# Patient Record
Sex: FEMALE | Race: WHITE | NOT HISPANIC OR LATINO | URBAN - METROPOLITAN AREA
[De-identification: names, ages, dates, MRNs, and addresses within clinical notes are randomized per-mention and may not be internally consistent; named-entity substitution may affect disease eponyms.]

---

## 2023-08-24 PROBLEM — L67.8 DRY HAIR: Status: ACTIVE | Noted: 2023-08-24

## 2023-08-24 PROBLEM — L85.3 DRY SKIN: Status: ACTIVE | Noted: 2023-08-24

## 2023-08-24 PROBLEM — I10 BENIGN ESSENTIAL HTN: Status: ACTIVE | Noted: 2023-08-24

## 2023-08-24 PROBLEM — E03.8 SUBCLINICAL HYPOTHYROIDISM: Status: ACTIVE | Noted: 2023-08-24

## 2023-08-24 PROBLEM — D58.2 ELEVATED HEMOGLOBIN (CMS-HCC): Status: ACTIVE | Noted: 2023-08-24

## 2023-08-24 PROBLEM — F31.9 BIPOLAR 1 DISORDER (MULTI): Status: ACTIVE | Noted: 2023-08-24

## 2023-08-24 PROBLEM — I42.9 CARDIOMYOPATHY (MULTI): Status: ACTIVE | Noted: 2023-08-24

## 2023-08-24 PROBLEM — M62.838 MUSCLE SPASM: Status: ACTIVE | Noted: 2023-08-24

## 2023-08-24 PROBLEM — R41.3 MEMORY LOSS: Status: ACTIVE | Noted: 2023-08-24

## 2023-08-24 PROBLEM — F07.0 FRONTAL LOBE SYNDROME: Status: ACTIVE | Noted: 2023-08-24

## 2023-08-24 PROBLEM — M19.90 OSTEOARTHRITIS: Status: ACTIVE | Noted: 2023-08-24

## 2023-08-24 PROBLEM — E78.5 HYPERLIPIDEMIA: Status: ACTIVE | Noted: 2023-08-24

## 2023-08-24 PROBLEM — H90.3 SENSORINEURAL HEARING LOSS, BILATERAL: Status: ACTIVE | Noted: 2023-08-24

## 2023-08-24 PROBLEM — I48.19 PERSISTENT ATRIAL FIBRILLATION (MULTI): Status: ACTIVE | Noted: 2023-08-24

## 2023-08-24 PROBLEM — M79.7 FIBROMYALGIA: Status: ACTIVE | Noted: 2023-08-24

## 2023-08-24 PROBLEM — H93.11 TINNITUS, RIGHT: Status: ACTIVE | Noted: 2023-08-24

## 2023-08-24 PROBLEM — M81.0 OSTEOPOROSIS: Status: ACTIVE | Noted: 2023-08-24

## 2023-08-24 PROBLEM — K59.09 CHRONIC CONSTIPATION: Status: ACTIVE | Noted: 2023-08-24

## 2023-08-24 PROBLEM — H91.90 HEARING LOSS: Status: ACTIVE | Noted: 2023-08-24

## 2023-08-24 PROBLEM — F03.90 MAJOR NEUROCOGNITIVE DISORDER (MULTI): Status: ACTIVE | Noted: 2023-08-24

## 2023-08-24 PROBLEM — Z86.79 HISTORY OF CARDIOMYOPATHY: Status: ACTIVE | Noted: 2023-08-24

## 2023-08-24 RX ORDER — METOPROLOL SUCCINATE 50 MG/1
1 TABLET, EXTENDED RELEASE ORAL DAILY
COMMUNITY
Start: 2018-12-17 | End: 2024-05-22

## 2023-08-24 RX ORDER — CHOLECALCIFEROL (VITAMIN D3) 25 MCG
1 TABLET ORAL DAILY
COMMUNITY
Start: 2019-11-06

## 2023-08-24 RX ORDER — METOPROLOL TARTRATE 50 MG/1
1 TABLET ORAL 2 TIMES DAILY
COMMUNITY

## 2023-08-24 RX ORDER — ZINC SULFATE 50(220)MG
CAPSULE ORAL
COMMUNITY

## 2023-08-24 RX ORDER — IBUPROFEN 100 MG/5ML
1 SUSPENSION, ORAL (FINAL DOSE FORM) ORAL DAILY
COMMUNITY
Start: 2019-11-06

## 2023-08-24 RX ORDER — UBIDECARENONE/VIT E ACET 100MG-5
CAPSULE ORAL
COMMUNITY
Start: 2022-06-23

## 2023-08-24 RX ORDER — APIXABAN 5 MG/1
1 TABLET, FILM COATED ORAL 2 TIMES DAILY
COMMUNITY
Start: 2016-08-17 | End: 2024-03-25

## 2023-08-24 RX ORDER — RISPERIDONE 2 MG/1
1 TABLET ORAL NIGHTLY
COMMUNITY
Start: 2016-03-09 | End: 2023-12-05

## 2023-08-24 RX ORDER — LEVOTHYROXINE SODIUM 25 UG/1
1 TABLET ORAL DAILY
COMMUNITY
Start: 2016-02-26 | End: 2024-03-13 | Stop reason: SDUPTHER

## 2023-08-24 RX ORDER — AMLODIPINE BESYLATE 5 MG/1
1 TABLET ORAL DAILY
COMMUNITY
End: 2024-04-02 | Stop reason: WASHOUT

## 2023-10-01 PROBLEM — R07.89 CHEST PAIN, MIDSTERNAL: Status: ACTIVE | Noted: 2023-10-01
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
## 2023-10-03 ENCOUNTER — OFFICE VISIT (OUTPATIENT)
Dept: CARDIOLOGY | Facility: CLINIC | Age: 73
End: 2023-10-03
Payer: MEDICARE

## 2023-10-03 VITALS
DIASTOLIC BLOOD PRESSURE: 82 MMHG | HEART RATE: 104 BPM | BODY MASS INDEX: 23.74 KG/M2 | WEIGHT: 134 LBS | SYSTOLIC BLOOD PRESSURE: 134 MMHG | OXYGEN SATURATION: 93 %

## 2023-10-03 DIAGNOSIS — I48.19 PERSISTENT ATRIAL FIBRILLATION (MULTI): Primary | ICD-10-CM

## 2023-10-03 DIAGNOSIS — I10 PRIMARY HYPERTENSION: ICD-10-CM

## 2023-10-03 DIAGNOSIS — I42.9 CARDIOMYOPATHY, UNSPECIFIED TYPE (MULTI): ICD-10-CM

## 2023-10-03 PROBLEM — I48.91 ATRIAL FIBRILLATION (MULTI): Status: ACTIVE | Noted: 2023-10-03

## 2023-10-03 PROCEDURE — 3075F SYST BP GE 130 - 139MM HG: CPT | Performed by: INTERNAL MEDICINE

## 2023-10-03 PROCEDURE — 99214 OFFICE O/P EST MOD 30 MIN: CPT | Performed by: INTERNAL MEDICINE

## 2023-10-03 PROCEDURE — 93000 ELECTROCARDIOGRAM COMPLETE: CPT | Performed by: INTERNAL MEDICINE

## 2023-10-03 PROCEDURE — 1159F MED LIST DOCD IN RCRD: CPT | Performed by: INTERNAL MEDICINE

## 2023-10-03 PROCEDURE — 3079F DIAST BP 80-89 MM HG: CPT | Performed by: INTERNAL MEDICINE

## 2023-10-03 PROCEDURE — 1160F RVW MEDS BY RX/DR IN RCRD: CPT | Performed by: INTERNAL MEDICINE

## 2023-10-03 NOTE — ASSESSMENT & PLAN NOTE
· Paroxysmal-had 1/30/2016 ELIZABETH DCCV at [place] but went back into Afib 2      days later.      Was in NSR on 5/24/21 OV.       In AFIB 6/23/22-increased metoprolol but still in afib on 8/29/22 OV      9/15/22 DCCV to NSR      In AFIB on 9/30/22 OV-asymptomatic/rate controlled.      Saw EP-continue rate control/AC(On Eliquis)

## 2023-10-03 NOTE — PROGRESS NOTES
Chief Complaint:   Coronary Artery Disease (6 MONTH FUV )     History Of Present Illness:    Poppy Pop is a 72 y.o. female.  Patient denies chest pain/SOB/palpitations/dizziness/lightheadedness/edema/claudication.  Active-walks    No bleeding with Eliquis     Last Recorded Vitals:  Vitals:    10/03/23 1329   BP: 134/82   BP Location: Left arm   Patient Position: Sitting   Pulse: 104   SpO2: 93%   Weight: 60.8 kg (134 lb)            Allergies:  Atorvastatin, Penicillins, Nsaids (non-steroidal anti-inflammatory drug), and Valdecoxib    Outpatient Medications:  Current Outpatient Medications   Medication Instructions    amLODIPine (Norvasc) 5 mg tablet 1 tablet, oral, Daily    ascorbic acid (Vitamin C) 1,000 mg tablet 1 tablet, oral, Daily    cholecalciferol (Vitamin D-3) 25 MCG (1000 UT) tablet 1 tablet, oral, Daily    collagen/biotin/ascorbic acid (COLLAGEN 1500 PLUS C ORAL) 1 capsule, oral, Daily    Eliquis 5 mg tablet 1 tablet, oral, 2 times daily    levothyroxine (Synthroid, Levoxyl) 25 mcg tablet 1 tablet, oral, Daily    metoprolol succinate XL (Toprol-XL) 50 mg 24 hr tablet 1 tablet, oral, Daily    metoprolol tartrate (Lopressor) 50 mg tablet 1 tablet, oral, 2 times daily    NON FORMULARY oral, Chlorella Oral [id number] 4 times a day    NON FORMULARY 1 Dose, Both Eyes, 4 times daily PRN    QUERCETIN ORAL 50 mg    resveratroL 100 mg capsule Resveratrol 100 MG Oral Capsule  Refills: 0  Start: 23-Jun-2022    risperiDONE (RisperDAL) 2 mg tablet 1 tablet, oral, Nightly    VITAMIN B COMPLEX ORAL primal force (b vitamins) liquid form<BR>    zinc sulfate (Zincate) 220 (50 Zn) MG capsule Zinc CAPS  Refills: 0       Physical Exam:  PHYSICAL EXAM:    Constitutional/General:  Alert and oriented x3, well appearing, nontoxic, and in NAD  Neck:  No increased JVP,no carotid bruits.  Respiratory:  Lungs clear to auscultation bilaterally, no wheezes, rales, or rhonchi, not in respiratory distress  Cardiovascular:  HIIR with  "rate OK, no murmurs, gallops, or rubs, PMI nondisplaced, 2+ distal pulses  Chest:  No chest wall tenderness  GI:  Abdomen soft, nontender, nondistended, + BS, no organomegaly, no palpable masses, no rebound, guarding, or rigidity  Musculoskeletal:  Moves all extremities x4  Extremities: No peripheral edema  Integument: Skin warm and dry, no rashes  Neurologic:  No focal deficits  Psychiatric:  Normal affect    Vital Signs, Nursing Notes, Allergies, and Medications reviewed by me.     Last Labs:  CBC -  Lab Results   Component Value Date    WBC 5.9 05/04/2022    HGB 17.1 (H) 05/04/2022    HCT 53.9 (H) 05/04/2022    MCV 97 05/04/2022     05/04/2022       CMP -  Lab Results   Component Value Date    CALCIUM 10.3 05/04/2022    PROT 8.0 05/04/2022    ALBUMIN 4.5 05/04/2022    AST 23 05/04/2022    ALT 24 05/04/2022    ALKPHOS 97 05/04/2022    BILITOT 1.0 05/04/2022       LIPID PANEL -   Lab Results   Component Value Date    CHOL 187 05/04/2022    TRIG 114 05/04/2022    HDL 74.6 05/04/2022    CHHDL 2.5 05/04/2022    LDLF 90 05/04/2022    VLDL 23 05/04/2022       RENAL FUNCTION PANEL -   Lab Results   Component Value Date    GLUCOSE 94 05/04/2022     05/04/2022    K 3.7 05/04/2022     05/04/2022    CO2 31 05/04/2022    ANIONGAP 13 05/04/2022    BUN 30 (H) 05/04/2022    CREATININE 0.84 05/04/2022    CALCIUM 10.3 05/04/2022    ALBUMIN 4.5 05/04/2022        No results found for: \"BNP\", \"HGBA1C\"        Lab review: I have personally reviewed the laboratory result(s)    Assessment/Plan       · Atrial fibrillation (427.31) (I48.91)   · Paroxysmal-had 1/30/2016 ELIZABETH DCCV at [place] but went back into Afib 2      days later.      Was in NSR on 5/24/21 OV.       In AFIB 6/23/22-increased metoprolol but still in afib on 8/29/22 OV      9/15/22 DCCV to NSR      In AFIB on 9/30/22 OV-asymptomatic/rate controlled.      Saw EP-continue rate control/AC(On Eliquis)   · Benign essential HTN (401.1) (I10)   · BP " stable   · Bipolar affective disorder (296.80) (F31.9)   · Follows with psychiatry   · Cardiac risk counseling (V65.49) (Z71.89)   · 2018 coronary calcuim score =0      5/2022 LDL=90      No definite statin indication      Recheck lipids   · History of cardiomyopathy (V12.59) (Z86.79)   · EF=45-50% per 1/2016 echo in setting afib.3/2018 echo with NL LVEF.  EF 55-60% per 4/2023 echo    Check lipids/BMP    Pancho Pérez, DO

## 2023-10-10 ENCOUNTER — LAB (OUTPATIENT)
Dept: LAB | Facility: LAB | Age: 73
End: 2023-10-10
Payer: MEDICARE

## 2023-10-10 DIAGNOSIS — I48.19 PERSISTENT ATRIAL FIBRILLATION (MULTI): ICD-10-CM

## 2023-10-10 DIAGNOSIS — I10 PRIMARY HYPERTENSION: ICD-10-CM

## 2023-10-10 LAB
ANION GAP SERPL CALC-SCNC: 14 MMOL/L (ref 10–20)
BUN SERPL-MCNC: 24 MG/DL (ref 6–23)
CALCIUM SERPL-MCNC: 10 MG/DL (ref 8.6–10.6)
CHLORIDE SERPL-SCNC: 103 MMOL/L (ref 98–107)
CHOLEST SERPL-MCNC: 207 MG/DL (ref 0–199)
CHOLESTEROL/HDL RATIO: 2.6
CO2 SERPL-SCNC: 30 MMOL/L (ref 21–32)
CREAT SERPL-MCNC: 0.97 MG/DL (ref 0.5–1.05)
GFR SERPL CREATININE-BSD FRML MDRD: 61 ML/MIN/1.73M*2
GLUCOSE SERPL-MCNC: 88 MG/DL (ref 74–99)
HDLC SERPL-MCNC: 78.3 MG/DL
LDLC SERPL CALC-MCNC: 107 MG/DL (ref 140–190)
NON HDL CHOLESTEROL: 129 MG/DL (ref 0–149)
POTASSIUM SERPL-SCNC: 4.2 MMOL/L (ref 3.5–5.3)
SODIUM SERPL-SCNC: 143 MMOL/L (ref 136–145)
TRIGL SERPL-MCNC: 110 MG/DL (ref 0–149)
VLDL: 22 MG/DL (ref 0–40)

## 2023-10-10 PROCEDURE — 80048 BASIC METABOLIC PNL TOTAL CA: CPT

## 2023-10-10 PROCEDURE — 80061 LIPID PANEL: CPT

## 2023-10-10 PROCEDURE — 36415 COLL VENOUS BLD VENIPUNCTURE: CPT

## 2023-10-26 ENCOUNTER — OFFICE VISIT (OUTPATIENT)
Dept: BEHAVIORAL HEALTH | Facility: CLINIC | Age: 73
End: 2023-10-26
Payer: MEDICARE

## 2023-10-26 VITALS — TEMPERATURE: 97.8 F | BODY MASS INDEX: 24.1 KG/M2 | HEIGHT: 63 IN | WEIGHT: 136 LBS | HEART RATE: 86 BPM

## 2023-10-26 DIAGNOSIS — F31.9 BIPOLAR 1 DISORDER (MULTI): ICD-10-CM

## 2023-10-26 PROCEDURE — 1159F MED LIST DOCD IN RCRD: CPT | Performed by: PSYCHIATRY & NEUROLOGY

## 2023-10-26 PROCEDURE — 99213 OFFICE O/P EST LOW 20 MIN: CPT | Performed by: PSYCHIATRY & NEUROLOGY

## 2023-10-26 PROCEDURE — 1126F AMNT PAIN NOTED NONE PRSNT: CPT | Performed by: PSYCHIATRY & NEUROLOGY

## 2023-10-26 PROCEDURE — 1160F RVW MEDS BY RX/DR IN RCRD: CPT | Performed by: PSYCHIATRY & NEUROLOGY

## 2023-10-26 ASSESSMENT — PAIN SCALES - GENERAL: PAINLEVEL: 0-NO PAIN
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
## 2023-10-26 NOTE — PATIENT INSTRUCTIONS
Plan:   - Continue risperdal 2mg at bedtime.   - Healthy lifestyle factors discussed.   - Follow up 6 months.   - Call sooner ([phone]) in case of any questions or concerns.     Brain healthy lifestyle:   - Make sure your medical conditions (if any) such as diabetes, high blood pressure, high cholesterol, thyroid disease sleep apnea are optimally controlled.   - Use eyeglasses or hearing aids appropriately if needed.   - Eat a heart healthy diet (like a Mediterranean diet; lots of fruits and vegetables, fish; low fat;)  - Exercise regularly as tolerated.   - Maintain good sleep hygiene; avoid daytime naps; try to get 7 to 8 hours of continuous sleep at night.   - Stay mentally active - puzzles, word searches, books, playing cards.  - Stay socially active and engaged.

## 2023-12-05 DIAGNOSIS — F31.9 BIPOLAR 1 DISORDER (MULTI): ICD-10-CM

## 2023-12-05 RX ORDER — RISPERIDONE 2 MG/1
2 TABLET ORAL NIGHTLY
Qty: 90 TABLET | Refills: 1 | Status: SHIPPED | OUTPATIENT
Start: 2023-12-05 | End: 2024-04-19

## 2023-12-15 ENCOUNTER — NURSE ONLY (OUTPATIENT)
Dept: BEHAVIORAL HEALTH | Facility: CLINIC | Age: 73
End: 2023-12-15
Payer: MEDICARE

## 2023-12-15 NOTE — PROGRESS NOTES
Patient called with concerns for a refill on her risperidone 2mg, RN follow up with the pharmacy and they report that patient will be able to  medication on 12/23.  RN has called and informed patient that she will be able to  medication on 12/23

## 2023-12-21 ENCOUNTER — TELEPHONE (OUTPATIENT)
Dept: OTHER | Age: 73
End: 2023-12-21
Payer: MEDICARE

## 2023-12-21 NOTE — TELEPHONE ENCOUNTER
Please contact the patient at [phone].    Patient has questions/concerns regarding a recent application she submitted for a discount on her Risperidone prescription. Nadyana is requiring a letter from the treating physician.

## 2024-03-11 DIAGNOSIS — E03.8 OTHER SPECIFIED HYPOTHYROIDISM: ICD-10-CM

## 2024-03-11 DIAGNOSIS — E03.8 SUBCLINICAL HYPOTHYROIDISM: ICD-10-CM

## 2024-03-13 RX ORDER — LEVOTHYROXINE SODIUM 25 UG/1
25 TABLET ORAL DAILY
Qty: 30 TABLET | Refills: 0 | Status: SHIPPED | OUTPATIENT
Start: 2024-03-13

## 2024-03-24 DIAGNOSIS — I48.91 UNSPECIFIED ATRIAL FIBRILLATION (MULTI): ICD-10-CM

## 2024-03-25 RX ORDER — APIXABAN 5 MG/1
5 TABLET, FILM COATED ORAL 2 TIMES DAILY
Qty: 60 TABLET | Refills: 7 | Status: SHIPPED | OUTPATIENT
Start: 2024-03-25 | End: 2024-04-02 | Stop reason: SDUPTHER

## 2024-03-26 PROBLEM — Z71.89 CARDIAC RISK COUNSELING: Status: ACTIVE | Noted: 2024-03-26

## 2024-03-26 PROBLEM — Z86.79 HISTORY OF CARDIOMYOPATHY: Status: RESOLVED | Noted: 2023-08-24 | Resolved: 2024-03-26

## 2024-04-02 ENCOUNTER — OFFICE VISIT (OUTPATIENT)
Dept: CARDIOLOGY | Facility: CLINIC | Age: 74
End: 2024-04-02
Payer: MEDICARE

## 2024-04-02 VITALS
HEART RATE: 107 BPM | SYSTOLIC BLOOD PRESSURE: 130 MMHG | WEIGHT: 142 LBS | BODY MASS INDEX: 25.15 KG/M2 | OXYGEN SATURATION: 95 % | DIASTOLIC BLOOD PRESSURE: 90 MMHG

## 2024-04-02 DIAGNOSIS — I10 BENIGN ESSENTIAL HTN: Primary | ICD-10-CM

## 2024-04-02 DIAGNOSIS — Z71.89 CARDIAC RISK COUNSELING: ICD-10-CM

## 2024-04-02 DIAGNOSIS — I10 PRIMARY HYPERTENSION: ICD-10-CM

## 2024-04-02 DIAGNOSIS — I42.9 CARDIOMYOPATHY, UNSPECIFIED TYPE (MULTI): ICD-10-CM

## 2024-04-02 DIAGNOSIS — I48.21 PERMANENT ATRIAL FIBRILLATION (MULTI): ICD-10-CM

## 2024-04-02 DIAGNOSIS — I48.91 UNSPECIFIED ATRIAL FIBRILLATION (MULTI): ICD-10-CM

## 2024-04-02 PROCEDURE — 1036F TOBACCO NON-USER: CPT | Performed by: INTERNAL MEDICINE

## 2024-04-02 PROCEDURE — 99213 OFFICE O/P EST LOW 20 MIN: CPT | Performed by: INTERNAL MEDICINE

## 2024-04-02 PROCEDURE — 3080F DIAST BP >= 90 MM HG: CPT | Performed by: INTERNAL MEDICINE

## 2024-04-02 PROCEDURE — 3075F SYST BP GE 130 - 139MM HG: CPT | Performed by: INTERNAL MEDICINE

## 2024-04-02 PROCEDURE — 1159F MED LIST DOCD IN RCRD: CPT | Performed by: INTERNAL MEDICINE

## 2024-04-02 PROCEDURE — 1160F RVW MEDS BY RX/DR IN RCRD: CPT | Performed by: INTERNAL MEDICINE

## 2024-04-02 NOTE — PROGRESS NOTES
Chief Complaint:   Follow-up (Patient is here for a followup)     History Of Present Illness:    Poppy Pop is a 73 y.o. female.  Patient denies chest pain/SOB/palpitations/dizziness/lightheadedness/edema/claudication.  Active-walks  Has constipation    No bleeding with Eliquis     Last Recorded Vitals:  Vitals:    04/02/24 1313 04/02/24 1336   BP: (!) 136/96 130/90   BP Location: Right arm    Patient Position: Sitting    BP Cuff Size: Adult    Pulse: 107    SpO2: 95%    Weight: 64.4 kg (142 lb)             Allergies:  Atorvastatin, Penicillins, Betamethasone dipropionate, Nsaids (non-steroidal anti-inflammatory drug), and Valdecoxib    Outpatient Medications:  Current Outpatient Medications   Medication Instructions    ascorbic acid (Vitamin C) 1,000 mg tablet 1 tablet, oral, Daily    cholecalciferol (Vitamin D-3) 25 MCG (1000 UT) tablet 1 tablet, oral, Daily    collagen/biotin/ascorbic acid (COLLAGEN 1500 PLUS C ORAL) 1 capsule, oral, Daily    Eliquis 5 mg, oral, 2 times daily    levothyroxine (SYNTHROID, LEVOXYL) 25 mcg, oral, Daily    metoprolol succinate XL (Toprol-XL) 50 mg 24 hr tablet 1 tablet, oral, Daily    metoprolol tartrate (Lopressor) 50 mg tablet 1 tablet, oral, 2 times daily    NON FORMULARY oral, Chlorella Oral [id number] 4 times a day    NON FORMULARY 1 Dose, Both Eyes, 4 times daily PRN    QUERCETIN ORAL 50 mg    resveratroL 100 mg capsule Resveratrol 100 MG Oral Capsule  Refills: 0  Start: 23-Jun-2022    risperiDONE (RISPERDAL) 2 mg, oral, Nightly    VITAMIN B COMPLEX ORAL primal force (b vitamins) liquid form<BR>    zinc sulfate (Zincate) 220 (50 Zn) MG capsule Zinc CAPS  Refills: 0       Physical Exam:  PHYSICAL EXAM:    Constitutional/General:  Alert and oriented x3, well appearing, nontoxic, and in NAD  Neck:  No increased JVP,no carotid bruits.  Respiratory:  Lungs clear to auscultation bilaterally, no wheezes, rales, or rhonchi, not in respiratory distress  Cardiovascular:  HIIR with  "rate OK, no murmurs, gallops, or rubs, PMI nondisplaced, 2+ distal pulses  Chest:  No chest wall tenderness  GI:  Abdomen soft, nontender, nondistended, + BS, no organomegaly, no palpable masses, no rebound, guarding, or rigidity  Musculoskeletal:  Moves all extremities x4  Extremities: No peripheral edema  Integument: Skin warm and dry, no rashes  Neurologic:  No focal deficits  Psychiatric:  Normal affect    Vital Signs, Nursing Notes, Allergies, and Medications reviewed by me.     Last Labs:  CBC -  Lab Results   Component Value Date    WBC 5.9 05/04/2022    HGB 17.1 (H) 05/04/2022    HCT 53.9 (H) 05/04/2022    MCV 97 05/04/2022     05/04/2022       CMP -  Lab Results   Component Value Date    CALCIUM 10.0 10/10/2023    PROT 8.0 05/04/2022    ALBUMIN 4.5 05/04/2022    AST 23 05/04/2022    ALT 24 05/04/2022    ALKPHOS 97 05/04/2022    BILITOT 1.0 05/04/2022       LIPID PANEL -   Lab Results   Component Value Date    CHOL 207 (H) 10/10/2023    TRIG 110 10/10/2023    HDL 78.3 10/10/2023    CHHDL 2.6 10/10/2023    LDLF 90 05/04/2022    VLDL 22 10/10/2023    NHDL 129 10/10/2023   10/2023 LDL equals 107    RENAL FUNCTION PANEL -   Lab Results   Component Value Date    GLUCOSE 88 10/10/2023     10/10/2023    K 4.2 10/10/2023     10/10/2023    CO2 30 10/10/2023    ANIONGAP 14 10/10/2023    BUN 24 (H) 10/10/2023    CREATININE 0.97 10/10/2023    CALCIUM 10.0 10/10/2023    ALBUMIN 4.5 05/04/2022        No results found for: \"BNP\", \"HGBA1C\"        Lab review: I have personally reviewed the laboratory result(s)    Assessment/Plan       · Atrial fibrillation (427.31) (I48.91)   · Paroxysmal-had 1/30/2016 ELIZABETH DCCV at [place] but went back into Afib 2      days later.      Was in NSR on 5/24/21 OV.       In AFIB 6/23/22-increased metoprolol but still in afib on 8/29/22 OV      9/15/22 DCCV to NSR      In AFIB on 9/30/22 OV-asymptomatic/rate controlled.      Saw EP-continue rate control/AC(On " Eliquis)   · Benign essential HTN (401.1) (I10)   · BP stable   · Bipolar affective disorder (296.80) (F31.9)   · Follows with psychiatry   · Cardiac risk counseling (V65.49) (Z71.89)   · 2018 coronary calcuim score =0      5/2022 LDL=90      No definite statin indication      Recheck lipids   · History of cardiomyopathy (V12.59) (Z86.79)   · EF=45-50% per 1/2016 echo in setting afib.3/2018 echo with NL LVEF.  EF 55-60% per 4/2023 echo    Watch salt intake as DBP high    Pancho Pérez, DO

## 2024-04-19 DIAGNOSIS — F31.9 BIPOLAR 1 DISORDER (MULTI): ICD-10-CM

## 2024-04-19 RX ORDER — RISPERIDONE 2 MG/1
2 TABLET ORAL NIGHTLY
Qty: 90 TABLET | Refills: 1 | Status: SHIPPED | OUTPATIENT
Start: 2024-04-19

## 2024-04-25 ENCOUNTER — OFFICE VISIT (OUTPATIENT)
Dept: BEHAVIORAL HEALTH | Facility: CLINIC | Age: 74
End: 2024-04-25
Payer: MEDICARE

## 2024-04-25 VITALS — HEIGHT: 63 IN | WEIGHT: 146.2 LBS | BODY MASS INDEX: 25.91 KG/M2 | TEMPERATURE: 96.5 F | HEART RATE: 80 BPM

## 2024-04-25 DIAGNOSIS — F31.9 BIPOLAR 1 DISORDER (MULTI): ICD-10-CM

## 2024-04-25 PROCEDURE — 1160F RVW MEDS BY RX/DR IN RCRD: CPT | Performed by: PSYCHIATRY & NEUROLOGY

## 2024-04-25 PROCEDURE — 1159F MED LIST DOCD IN RCRD: CPT | Performed by: PSYCHIATRY & NEUROLOGY

## 2024-04-25 PROCEDURE — 99213 OFFICE O/P EST LOW 20 MIN: CPT | Performed by: PSYCHIATRY & NEUROLOGY

## 2024-04-25 ASSESSMENT — ENCOUNTER SYMPTOMS
DEPRESSION: 0
OCCASIONAL FEELINGS OF UNSTEADINESS: 0
LOSS OF SENSATION IN FEET: 0
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
## 2024-04-25 NOTE — PATIENT INSTRUCTIONS
Plan:   - Continue risperdal 2mg at bedtime.   - Please talk to your primary care doctor about your elevated blood pressure.   - Brain-healthy lifestyle:        Eat heart-healthy diet       Regular physical activity        Regular mental exercise such as reading books, doing puzzles, playing cards/board games etc.        Stay socially engaged       Maintain good sleep hygiene and try to get 7 to 8 hours of continuous sleep at bedtime.   - Follow up in 6 months.   - Contact via [facility] or call sooner ([phone]) in case of any questions or concerns.  - Call [phone] for mental health crisis or suicidal thoughts, or call 911 or go to the nearest emergency room for emergencies.

## 2024-05-21 DIAGNOSIS — I10 ESSENTIAL (PRIMARY) HYPERTENSION: ICD-10-CM

## 2024-05-22 RX ORDER — METOPROLOL SUCCINATE 50 MG/1
50 TABLET, EXTENDED RELEASE ORAL DAILY
Qty: 90 TABLET | Refills: 2 | Status: SHIPPED | OUTPATIENT
Start: 2024-07-12

## 2024-06-21 DIAGNOSIS — E03.8 SUBCLINICAL HYPOTHYROIDISM: ICD-10-CM

## 2024-06-27 ENCOUNTER — TELEPHONE (OUTPATIENT)
Dept: CARDIOLOGY | Facility: CLINIC | Age: 74
End: 2024-06-27
Payer: MEDICARE

## 2024-06-27 NOTE — TELEPHONE ENCOUNTER
Pharmacy is asking for refill on levothyroxine 25mcg     Looks like it is for subclinical hypothyroidism     She has not had any lab work for this since 2022   Should blood work be done prior to sending in for refill?

## 2024-07-16 RX ORDER — LEVOTHYROXINE SODIUM 25 UG/1
25 TABLET ORAL DAILY
Qty: 30 TABLET | Refills: 0 | Status: SHIPPED | OUTPATIENT
Start: 2024-07-16

## 2024-08-01 ENCOUNTER — LAB (OUTPATIENT)
Dept: LAB | Facility: LAB | Age: 74
End: 2024-08-01
Payer: MEDICARE

## 2024-08-01 DIAGNOSIS — E03.8 SUBCLINICAL HYPOTHYROIDISM: ICD-10-CM

## 2024-08-01 LAB — TSH SERPL-ACNC: 4.27 MIU/L (ref 0.44–3.98)

## 2024-08-01 PROCEDURE — 36415 COLL VENOUS BLD VENIPUNCTURE: CPT

## 2024-08-01 PROCEDURE — 84443 ASSAY THYROID STIM HORMONE: CPT

## 2024-08-06 ENCOUNTER — TELEPHONE (OUTPATIENT)
Dept: CARDIOLOGY | Facility: CLINIC | Age: 74
End: 2024-08-06
Payer: MEDICARE

## 2024-08-06 DIAGNOSIS — E03.8 SUBCLINICAL HYPOTHYROIDISM: ICD-10-CM

## 2024-08-06 NOTE — TELEPHONE ENCOUNTER
Pt calling about her Levothyroxine is almost out Dr Pérez ordered TSH levels she needs a refill not sure if she should stay on the same dose pt said if she doesn't get call within the hour she will call back.

## 2024-08-07 DIAGNOSIS — E03.8 SUBCLINICAL HYPOTHYROIDISM: ICD-10-CM

## 2024-08-07 RX ORDER — LEVOTHYROXINE SODIUM 25 UG/1
37.5 TABLET ORAL DAILY
COMMUNITY
Start: 2024-08-07 | End: 2024-08-07 | Stop reason: SDUPTHER

## 2024-08-07 RX ORDER — LEVOTHYROXINE SODIUM 25 UG/1
37.5 TABLET ORAL DAILY
Qty: 160 TABLET | Refills: 0 | Status: SHIPPED | OUTPATIENT
Start: 2024-08-07

## 2024-09-10 ENCOUNTER — TELEPHONE (OUTPATIENT)
Dept: OBSTETRICS AND GYNECOLOGY | Facility: CLINIC | Age: 74
End: 2024-09-10
Payer: MEDICARE

## 2024-09-26 ENCOUNTER — CLINICAL SUPPORT (OUTPATIENT)
Dept: AUDIOLOGY | Facility: CLINIC | Age: 74
End: 2024-09-26
Payer: MEDICARE

## 2024-09-26 DIAGNOSIS — H90.3 SENSORINEURAL HEARING LOSS (SNHL) OF BOTH EARS: Primary | ICD-10-CM

## 2024-09-26 PROCEDURE — 92567 TYMPANOMETRY: CPT | Performed by: AUDIOLOGIST

## 2024-09-26 PROCEDURE — 92557 COMPREHENSIVE HEARING TEST: CPT | Performed by: AUDIOLOGIST

## 2024-09-26 NOTE — PROGRESS NOTES
Chief Complaint   Patient presents with    Hearing Loss     HISTORY:  Poppy Pop, age 73 years, was seen for audiogram in conjunction with otolaryngology appointment on 9/26/2024.  Ms. Pop reports bilateral hearing loss.  She states she has worn two sets of hearing aids in the past.  The most recent set was purchased through [facility] over six years ago.  The hearing aids have stopped working.  She states she is not interested in purchasing new hearing aids.  She plans to purchase two over the counter hearing aid amplifiers instead due to cost.  Ms. Pop feels her left ear is her better ear.  Please see medical records for complete history.    RESULTS:  Prior to testing both external auditory canals were clear and tympanic membranes visualized    Immittance and acoustic reflexes:  Immittance testing yielded TYPE A tympanograms indicating normal middle ear function both ears  Acoustic reflexes were not tested due to lack of seal.    Audiogram:  Mild to moderate severe sensorineural hearing loss 250  - 8000 Hz both ears  Speech reception thresholds obtained at 40 dBHL both ears  Speech discrimination scores were 100% at 80 dBHL    IMPRESSIONS:  Normal middle ear function noted both ears  Mild to moderate severe sensorineural hearing loss  Ms. Pop was given multiple copies of her audiogram today to assist in her pursuit of over the counter hearing aids    RECOMMENDATIONS:  1.  Follow up with otolaryngology  2.  Retest hearing levels annually    time: 1435 - 1457

## 2024-09-27 ENCOUNTER — APPOINTMENT (OUTPATIENT)
Dept: OTOLARYNGOLOGY | Facility: CLINIC | Age: 74
End: 2024-09-27
Payer: MEDICARE

## 2024-09-27 VITALS — WEIGHT: 134 LBS | BODY MASS INDEX: 23.74 KG/M2 | HEIGHT: 63 IN

## 2024-09-27 DIAGNOSIS — H90.3 SENSORINEURAL HEARING LOSS (SNHL) OF BOTH EARS: Primary | ICD-10-CM

## 2024-09-27 PROCEDURE — 99203 OFFICE O/P NEW LOW 30 MIN: CPT | Performed by: NURSE PRACTITIONER

## 2024-09-27 PROCEDURE — 1159F MED LIST DOCD IN RCRD: CPT | Performed by: NURSE PRACTITIONER

## 2024-09-27 PROCEDURE — 1160F RVW MEDS BY RX/DR IN RCRD: CPT | Performed by: NURSE PRACTITIONER

## 2024-09-27 PROCEDURE — 1036F TOBACCO NON-USER: CPT | Performed by: NURSE PRACTITIONER

## 2024-09-27 ASSESSMENT — PATIENT HEALTH QUESTIONNAIRE - PHQ9
SUM OF ALL RESPONSES TO PHQ9 QUESTIONS 1 AND 2: 0
1. LITTLE INTEREST OR PLEASURE IN DOING THINGS: NOT AT ALL
2. FEELING DOWN, DEPRESSED OR HOPELESS: NOT AT ALL

## 2024-09-27 NOTE — PROGRESS NOTES
Subjective   Patient ID: Poppy Pop is a 73 y.o. female who presents for New Patient Visit (Bilateral ear pain pt had a fall.).  HPI  This patient presents for hearing aid clearance and progressive bilateral sensorineural hearing loss.  She has worn hearing aids in the past with good benefit but they are no longer working.  She reports that they are 6 to 8 years old.  Today, she denies any otalgia, otorrhea.  She denies any history of otologic surgery.  Review of Systems  A comprehensive or 10 points review of the patient's constitutional, neurological, HEENT, pulmonary, cardiovascular and genito-urinary systems showed only those mentioned in the HPI.  Objective   Physical Exam  Constitutional: no fever, chills, weight loss or weight gain   General appearance: Appears well, well-nourished, well groomed. No acute distress.   Communication: Normal communication   Psychiatric: Oriented to person, place and time. Normal mood and affect.  Flight of thoughts and rapid speech.    Neurologic: Cranial nerves II-XII grossly intact and symmetric bilaterally.   Head and Face:   Head: Atraumatic with no masses, lesions or scarring.   Face: Normal symmetry, no paralysis, synkinesis or facial tic. No scars or deformities.     Eyes: Conjunctiva not edematous or erythematous   Ears: External inspection of ears with no deformity, scars or masses. Bilateral EACs clear and bilateral TMs intact with no signs of effusions   Nose: External inspection of nose: No nasal lesions, lacerations or scars.   Neck: Normal appearing, symmetric, trachea midline.   Cardiovascular: Examination of peripheral vascular system shows no clubbing or cyanosis.   Respiratory: No respiratory distress increased work of breathing. Inspection of the chest with symmetric chest expansion and normal respiratory effort.   Skin: No rashes in the head or neck  My interpretation of the audiogram done yesterday is mild to moderate sensorineural hearing loss  bilaterally.  Excellent word recognition scores and normal tympanograms bilaterally.  Assessment/Plan     This patient presents for initial evaluation of chronic acquired bilateral sensorineural hearing loss.    Reassurance given that otologic exam today is normal.  Audiogram was reviewed in detail.  She is an excellent candidate for new hearing aids but states that she cannot afford the out-of-pocket cost and is looking into over-the-counter hearing aids.  She may follow-up as needed.  All questions were answered to patient's satisfaction.    This note was created using speech recognition transcription software. Despite proofreading, several typographical errors might be present that might affect the meaning of the content. Please call with any questions.         TU Lindquist-CNP 09/27/24 9:24 AM

## 2024-10-02 ENCOUNTER — APPOINTMENT (OUTPATIENT)
Dept: CARDIOLOGY | Facility: CLINIC | Age: 74
End: 2024-10-02
Payer: MEDICARE

## 2024-10-16 NOTE — PROGRESS NOTES
"Subjective   Patient ID: Poppy Pop is a 73 y.o. female who presents for Medicare Annual Wellness Visit Subsequent.      HPI   The patient is taking levothyroxine for hypothyroidism and metoprolol for hypertension. She is taking these medications as prescribed and denies having side effects from them. She complains of chronic pain after having a fall.     Review of Systems  Constitutional: No fever or chills, No Night Sweats  Eyes: No Blurry Vision or Eye sight problems  ENT: No Nasal Discharge, Hoarseness, sore throat  Cardiovascular: no chest pain, no palpitations and no syncope.   Respiratory: no cough, no shortness of breath during exertion and no shortness of breath at rest.   Gastrointestinal: no abdominal pain, no nausea and no vomiting.   : No vaginal discharge, burning with urination, no blood in urine or stools  Skin: No Skin rashes or Lesions  Neuro: No Headache, no dizziness or Numbness or tingling  Psych: No Anxiety, depression or sleeping problems  Heme: No Easy bleeding or brusing.     Objective   /75   Pulse 64   Ht 1.607 m (5' 3.25\")   Wt 71.2 kg (157 lb)   SpO2 95%   BMI 27.59 kg/m²     Physical Exam  Constitutional: Alert and in no acute distress. Well developed, well nourished.   Head and Face: Head and face: Normal.    Eyes: Normal external exam.   Cardiovascular: Heart rate and rhythm were normal, normal S1 and S2. Pedal pulses: Normal. No peripheral edema.   Pulmonary: No respiratory distress. Clear bilateral breath sounds.   Musculoskeletal: No joint swelling seen, normal movements of all extremities. Range of motion: Normal.  Muscle strength/tone: Normal.    Skin: Normal skin color and pigmentation, normal skin turgor, and no rash.   Psychiatric: Patient has very vague thinking and very talkative, difficult to control her talking. Judgment and insight: poor    Lab Results   Component Value Date    WBC 5.9 05/04/2022    HGB 17.1 (H) 05/04/2022    HCT 53.9 (H) 05/04/2022    PLT " 222 05/04/2022    CHOL 207 (H) 10/10/2023    TRIG 110 10/10/2023    HDL 78.3 10/10/2023    ALT 24 05/04/2022    AST 23 05/04/2022     10/10/2023    K 4.2 10/10/2023     10/10/2023    CREATININE 0.97 10/10/2023    BUN 24 (H) 10/10/2023    CO2 30 10/10/2023    TSH 4.27 (H) 08/01/2024       ECG 12 Lead  afib  ECG 12 Lead  afib      Assessment/Plan   Diagnoses and all orders for this visit:  Medicare annual wellness visit, subsequent  -     1 Year Follow Up In [facility] - PCP - Wellness Exam; Future  Major neurocognitive disorder (Multi)  Elevated hemoglobin (CMS-HCC)  Persistent atrial fibrillation (Multi)  Benign essential HTN  -     CBC; Future  -     Comprehensive Metabolic Panel; Future  -     Lipid Panel; Future  Subclinical hypothyroidism  -     TSH with reflex to Free T4 if abnormal; Future  Bipolar 1 disorder (Multi)  Vitamin D deficiency  -     Vitamin B12; Future  -     Vitamin D 25-Hydroxy,Total (for eval of Vitamin D levels); Future  Elevated blood sugar  -     Hemoglobin A1C; Future  Asymptomatic menopausal state  -     XR DEXA bone density; Future  Encounter for screening mammogram for breast cancer  -     BI mammo bilateral screening tomosynthesis; Future  Fall, initial encounter  -     XR knee 4+ views bilateral; Future  -     XR ankle bilateral 2 views; Future  -     XR elbow left 3+ views; Future        Dear Poppy Pop     It was my pleasure to take care of you today in the office. Below are the things we discussed today:    1. 1. Immunizations: Yearly Flu shot is recommended. Declined by the patient          a: COVID:  Declined by the patient          b: Tetanus: Up-to-date         c: Shingrix:  Declined by the patient          d: RSV:  Declined by the patient          e: Prevnar:  She will let me know if she would like to get it     2. Blood Work: Ordered   3. Seen your dentist twice a year  4. Yearly Eye exam is recommended    5. BMI: Overweight   6: Diet recommendations:    Eat Clean, Try to have as many home cooked meals as possible  Avoid processed foods which contain excess calories, sugar, and sodium.    7. Exercise recommendations:   150 minutes a week to maintain your weight     If you have to loose weight, you need a better diet and exercise plan.     8. Supplements recommended:  a - Calcium 600 mg up to twice a day to get a total of 1200 mg. Each 8 oz of milk or yogurt or 1 oz of cheese, 1 Banana, 1 serving of green Leafy vegetable has about 300 mg of Calcium, so you may subtract that amount. Calcium citrate is the only acceptable supplement to take if you take an acid suppressing medication like Prilosec; otherwise Calcium carbonate is acceptable too (It can cause Constipation).   b - Vitamin D - 2000 IU daily     9. Please get your Living will / Advance directive completed if you do not have one already. Please make sure our office has a copy of the latest one.     10. Colonoscopy: Uptodate, repeat in Jan 2027   11. Mammogram: Ordered   12. PAP: Not indicated  13. DEXA: Ordered   14: Skin Check: Please see Dermatology once a year for a Skin Check.     15. Hypothyroidism: Continue levothyroxine.    16. Hypertension: Continue metoprolol.     17. Fall: Ordered ankle, knee and left elbow x-rays.    18. A fib -Continue Eliquis and Metoprolol and follow up with Cardiology    Follow up in one year for a Physical. Please call the office before your Physical to see if you need blood work completed prior to your physical.     Please call me if any questions arise from now until your next visit. I will call you after I am done seeing patients. A Doctor is always available by phone when the office is closed. Please feel free to call for help with any problem that you feel shouldn't wait until the office re-opens.     Scribe Attestation  By signing my name below, IKristen Scribe   attest that this documentation has been prepared under the direction and in the presence of  Anna Finney MD.

## 2024-10-17 ENCOUNTER — APPOINTMENT (OUTPATIENT)
Dept: OBSTETRICS AND GYNECOLOGY | Facility: CLINIC | Age: 74
End: 2024-10-17
Payer: COMMERCIAL

## 2024-10-17 ENCOUNTER — APPOINTMENT (OUTPATIENT)
Dept: PRIMARY CARE | Facility: CLINIC | Age: 74
End: 2024-10-17
Payer: MEDICARE

## 2024-10-17 ENCOUNTER — HOSPITAL ENCOUNTER (OUTPATIENT)
Dept: RADIOLOGY | Facility: CLINIC | Age: 74
Discharge: HOME | End: 2024-10-17
Payer: MEDICARE

## 2024-10-17 ENCOUNTER — LAB (OUTPATIENT)
Dept: LAB | Facility: LAB | Age: 74
End: 2024-10-17
Payer: COMMERCIAL

## 2024-10-17 VITALS
BODY MASS INDEX: 27.82 KG/M2 | HEIGHT: 63 IN | WEIGHT: 157 LBS | OXYGEN SATURATION: 95 % | HEART RATE: 64 BPM | SYSTOLIC BLOOD PRESSURE: 135 MMHG | DIASTOLIC BLOOD PRESSURE: 75 MMHG

## 2024-10-17 DIAGNOSIS — F03.90 MAJOR NEUROCOGNITIVE DISORDER (MULTI): ICD-10-CM

## 2024-10-17 DIAGNOSIS — E03.8 SUBCLINICAL HYPOTHYROIDISM: ICD-10-CM

## 2024-10-17 DIAGNOSIS — I48.19 PERSISTENT ATRIAL FIBRILLATION (MULTI): ICD-10-CM

## 2024-10-17 DIAGNOSIS — R73.9 ELEVATED BLOOD SUGAR: ICD-10-CM

## 2024-10-17 DIAGNOSIS — F31.9 BIPOLAR 1 DISORDER (MULTI): ICD-10-CM

## 2024-10-17 DIAGNOSIS — Z00.00 MEDICARE ANNUAL WELLNESS VISIT, SUBSEQUENT: Primary | ICD-10-CM

## 2024-10-17 DIAGNOSIS — E55.9 VITAMIN D DEFICIENCY: ICD-10-CM
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
## 2024-10-17 DIAGNOSIS — D58.2 ELEVATED HEMOGLOBIN (CMS-HCC): ICD-10-CM

## 2024-10-17 DIAGNOSIS — I10 BENIGN ESSENTIAL HTN: ICD-10-CM

## 2024-10-17 DIAGNOSIS — Z78.0 ASYMPTOMATIC MENOPAUSAL STATE: ICD-10-CM

## 2024-10-17 DIAGNOSIS — Z12.31 ENCOUNTER FOR SCREENING MAMMOGRAM FOR BREAST CANCER: ICD-10-CM

## 2024-10-17 PROBLEM — Z71.89 CARDIAC RISK COUNSELING: Status: RESOLVED | Noted: 2024-03-26 | Resolved: 2024-10-17

## 2024-10-17 PROBLEM — R07.89 CHEST PAIN, MIDSTERNAL: Status: RESOLVED | Noted: 2023-10-01 | Resolved: 2024-10-17

## 2024-10-17 PROBLEM — L67.8 DRY HAIR: Status: RESOLVED | Noted: 2023-08-24 | Resolved: 2024-10-17

## 2024-10-17 PROBLEM — M62.838 MUSCLE SPASM: Status: RESOLVED | Noted: 2023-08-24 | Resolved: 2024-10-17

## 2024-10-17 PROBLEM — I48.91 ATRIAL FIBRILLATION (MULTI): Status: RESOLVED | Noted: 2023-10-03 | Resolved: 2024-10-17

## 2024-10-17 PROBLEM — F07.0 FRONTAL LOBE SYNDROME: Status: RESOLVED | Noted: 2023-08-24 | Resolved: 2024-10-17

## 2024-10-17 PROBLEM — L85.3 DRY SKIN: Status: RESOLVED | Noted: 2023-08-24 | Resolved: 2024-10-17

## 2024-10-17 PROBLEM — H91.90 HEARING LOSS: Status: RESOLVED | Noted: 2023-08-24 | Resolved: 2024-10-17

## 2024-10-17 PROCEDURE — 73564 X-RAY EXAM KNEE 4 OR MORE: CPT | Mod: 50

## 2024-10-17 PROCEDURE — 84443 ASSAY THYROID STIM HORMONE: CPT

## 2024-10-17 PROCEDURE — 80053 COMPREHEN METABOLIC PANEL: CPT

## 2024-10-17 PROCEDURE — 80061 LIPID PANEL: CPT

## 2024-10-17 PROCEDURE — 82306 VITAMIN D 25 HYDROXY: CPT

## 2024-10-17 PROCEDURE — 73600 X-RAY EXAM OF ANKLE: CPT | Mod: 50

## 2024-10-17 PROCEDURE — 83036 HEMOGLOBIN GLYCOSYLATED A1C: CPT

## 2024-10-17 PROCEDURE — 73080 X-RAY EXAM OF ELBOW: CPT | Mod: LT

## 2024-10-17 PROCEDURE — 82607 VITAMIN B-12: CPT

## 2024-10-17 PROCEDURE — 36415 COLL VENOUS BLD VENIPUNCTURE: CPT

## 2024-10-17 PROCEDURE — 85027 COMPLETE CBC AUTOMATED: CPT

## 2024-10-17 ASSESSMENT — ENCOUNTER SYMPTOMS
LOSS OF SENSATION IN FEET: 0
OCCASIONAL FEELINGS OF UNSTEADINESS: 0
DEPRESSION: 0

## 2024-10-17 ASSESSMENT — COLUMBIA-SUICIDE SEVERITY RATING SCALE - C-SSRS
2. HAVE YOU ACTUALLY HAD ANY THOUGHTS OF KILLING YOURSELF?: NO
1. IN THE PAST MONTH, HAVE YOU WISHED YOU WERE DEAD OR WISHED YOU COULD GO TO SLEEP AND NOT WAKE UP?: NO

## 2024-10-17 ASSESSMENT — ACTIVITIES OF DAILY LIVING (ADL)
BATHING: INDEPENDENT
MANAGING_FINANCES: INDEPENDENT
DRESSING: INDEPENDENT
GROCERY_SHOPPING: INDEPENDENT
TAKING_MEDICATION: INDEPENDENT
DOING_HOUSEWORK: INDEPENDENT

## 2024-10-17 ASSESSMENT — PATIENT HEALTH QUESTIONNAIRE - PHQ9
1. LITTLE INTEREST OR PLEASURE IN DOING THINGS: NOT AT ALL
SUM OF ALL RESPONSES TO PHQ9 QUESTIONS 1 AND 2: 0
2. FEELING DOWN, DEPRESSED OR HOPELESS: NOT AT ALL

## 2024-10-18 LAB
25(OH)D3 SERPL-MCNC: 46 NG/ML (ref 30–100)
ALBUMIN SERPL BCP-MCNC: 4.5 G/DL (ref 3.4–5)
ALP SERPL-CCNC: 88 U/L (ref 33–136)
ALT SERPL W P-5'-P-CCNC: 25 U/L (ref 7–45)
ANION GAP SERPL CALC-SCNC: 16 MMOL/L (ref 10–20)
AST SERPL W P-5'-P-CCNC: 24 U/L (ref 9–39)
BILIRUB SERPL-MCNC: 1.2 MG/DL (ref 0–1.2)
BUN SERPL-MCNC: 18 MG/DL (ref 6–23)
CALCIUM SERPL-MCNC: 10.1 MG/DL (ref 8.6–10.6)
CHLORIDE SERPL-SCNC: 100 MMOL/L (ref 98–107)
CHOLEST SERPL-MCNC: 195 MG/DL (ref 0–199)
CHOLESTEROL/HDL RATIO: 2.6
CO2 SERPL-SCNC: 28 MMOL/L (ref 21–32)
CREAT SERPL-MCNC: 0.82 MG/DL (ref 0.5–1.05)
EGFRCR SERPLBLD CKD-EPI 2021: 75 ML/MIN/1.73M*2
ERYTHROCYTE [DISTWIDTH] IN BLOOD BY AUTOMATED COUNT: 13.9 % (ref 11.5–14.5)
EST. AVERAGE GLUCOSE BLD GHB EST-MCNC: 108 MG/DL
GLUCOSE SERPL-MCNC: 96 MG/DL (ref 74–99)
HBA1C MFR BLD: 5.4 %
HCT VFR BLD AUTO: 54 % (ref 36–46)
HDLC SERPL-MCNC: 76.2 MG/DL
HGB BLD-MCNC: 17.1 G/DL (ref 12–16)
LDLC SERPL CALC-MCNC: 94 MG/DL
MCH RBC QN AUTO: 30.7 PG (ref 26–34)
MCHC RBC AUTO-ENTMCNC: 31.7 G/DL (ref 32–36)
MCV RBC AUTO: 97 FL (ref 80–100)
NON HDL CHOLESTEROL: 119 MG/DL (ref 0–149)
NRBC BLD-RTO: 0 /100 WBCS (ref 0–0)
PLATELET # BLD AUTO: 204 X10*3/UL (ref 150–450)
POTASSIUM SERPL-SCNC: 4.4 MMOL/L (ref 3.5–5.3)
PROT SERPL-MCNC: 8.2 G/DL (ref 6.4–8.2)
RBC # BLD AUTO: 5.57 X10*6/UL (ref 4–5.2)
SODIUM SERPL-SCNC: 140 MMOL/L (ref 136–145)
TRIGL SERPL-MCNC: 125 MG/DL (ref 0–149)
TSH SERPL-ACNC: 3.55 MIU/L (ref 0.44–3.98)
VIT B12 SERPL-MCNC: 1057 PG/ML (ref 211–911)
VLDL: 25 MG/DL (ref 0–40)
WBC # BLD AUTO: 5.2 X10*3/UL (ref 4.4–11.3)

## 2024-10-21 ENCOUNTER — APPOINTMENT (OUTPATIENT)
Dept: OBSTETRICS AND GYNECOLOGY | Facility: CLINIC | Age: 74
End: 2024-10-21
Payer: MEDICARE

## 2024-10-24 ENCOUNTER — OFFICE VISIT (OUTPATIENT)
Dept: ORTHOPEDIC SURGERY | Facility: CLINIC | Age: 74
End: 2024-10-24
Payer: MEDICARE

## 2024-10-24 ENCOUNTER — APPOINTMENT (OUTPATIENT)
Dept: BEHAVIORAL HEALTH | Facility: CLINIC | Age: 74
End: 2024-10-24
Payer: MEDICARE

## 2024-10-24 VITALS — BODY MASS INDEX: 26.8 KG/M2 | HEIGHT: 64 IN | WEIGHT: 157 LBS

## 2024-10-24 DIAGNOSIS — M25.562 BILATERAL CHRONIC KNEE PAIN: Primary | ICD-10-CM

## 2024-10-24 DIAGNOSIS — M25.561 BILATERAL CHRONIC KNEE PAIN: Primary | ICD-10-CM

## 2024-10-24 DIAGNOSIS — M17.11 PRIMARY OSTEOARTHRITIS OF RIGHT KNEE: ICD-10-CM

## 2024-10-24 DIAGNOSIS — M17.12 PRIMARY OSTEOARTHRITIS OF LEFT KNEE: ICD-10-CM

## 2024-10-24 DIAGNOSIS — G89.29 BILATERAL CHRONIC KNEE PAIN: Primary | ICD-10-CM

## 2024-10-24 PROCEDURE — 1123F ACP DISCUSS/DSCN MKR DOCD: CPT | Performed by: ORTHOPAEDIC SURGERY

## 2024-10-24 PROCEDURE — 1036F TOBACCO NON-USER: CPT | Performed by: ORTHOPAEDIC SURGERY

## 2024-10-24 PROCEDURE — 1125F AMNT PAIN NOTED PAIN PRSNT: CPT | Performed by: ORTHOPAEDIC SURGERY

## 2024-10-24 PROCEDURE — 1160F RVW MEDS BY RX/DR IN RCRD: CPT | Performed by: ORTHOPAEDIC SURGERY

## 2024-10-24 PROCEDURE — 99213 OFFICE O/P EST LOW 20 MIN: CPT | Performed by: ORTHOPAEDIC SURGERY

## 2024-10-24 PROCEDURE — 1159F MED LIST DOCD IN RCRD: CPT | Performed by: ORTHOPAEDIC SURGERY

## 2024-10-24 PROCEDURE — 99203 OFFICE O/P NEW LOW 30 MIN: CPT | Performed by: ORTHOPAEDIC SURGERY

## 2024-10-24 ASSESSMENT — COLUMBIA-SUICIDE SEVERITY RATING SCALE - C-SSRS
2. HAVE YOU ACTUALLY HAD ANY THOUGHTS OF KILLING YOURSELF?: NO
1. IN THE PAST MONTH, HAVE YOU WISHED YOU WERE DEAD OR WISHED YOU COULD GO TO SLEEP AND NOT WAKE UP?: NO
6. HAVE YOU EVER DONE ANYTHING, STARTED TO DO ANYTHING, OR PREPARED TO DO ANYTHING TO END YOUR LIFE?: NO

## 2024-10-24 ASSESSMENT — LIFESTYLE VARIABLES
HOW OFTEN DURING THE LAST YEAR HAVE YOU BEEN UNABLE TO REMEMBER WHAT HAPPENED THE NIGHT BEFORE BECAUSE YOU HAD BEEN DRINKING: NEVER
AUDIT TOTAL SCORE: 0
AUDIT-C TOTAL SCORE: 0
HOW OFTEN DURING THE LAST YEAR HAVE YOU NEEDED AN ALCOHOLIC DRINK FIRST THING IN THE MORNING TO GET YOURSELF GOING AFTER A NIGHT OF HEAVY DRINKING: NEVER
HOW OFTEN DURING THE LAST YEAR HAVE YOU FOUND THAT YOU WERE NOT ABLE TO STOP DRINKING ONCE YOU HAD STARTED: NEVER
HOW OFTEN DURING THE LAST YEAR HAVE YOU HAD A FEELING OF GUILT OR REMORSE AFTER DRINKING: NEVER
HAVE YOU OR SOMEONE ELSE BEEN INJURED AS A RESULT OF YOUR DRINKING: NO
HAS A RELATIVE, FRIEND, DOCTOR, OR ANOTHER HEALTH PROFESSIONAL EXPRESSED CONCERN ABOUT YOUR DRINKING OR SUGGESTED YOU CUT DOWN: NO
HOW OFTEN DURING THE LAST YEAR HAVE YOU FAILED TO DO WHAT WAS NORMALLY EXPECTED FROM YOU BECAUSE OF DRINKING: NEVER
HOW OFTEN DO YOU HAVE A DRINK CONTAINING ALCOHOL: NEVER
HOW OFTEN DO YOU HAVE SIX OR MORE DRINKS ON ONE OCCASION: NEVER
HOW MANY STANDARD DRINKS CONTAINING ALCOHOL DO YOU HAVE ON A TYPICAL DAY: PATIENT DOES NOT DRINK
SKIP TO QUESTIONS 9-10: 1

## 2024-10-24 ASSESSMENT — PATIENT HEALTH QUESTIONNAIRE - PHQ9
SUM OF ALL RESPONSES TO PHQ9 QUESTIONS 1 AND 2: 0
2. FEELING DOWN, DEPRESSED OR HOPELESS: NOT AT ALL
1. LITTLE INTEREST OR PLEASURE IN DOING THINGS: NOT AT ALL

## 2024-10-24 ASSESSMENT — PAIN - FUNCTIONAL ASSESSMENT: PAIN_FUNCTIONAL_ASSESSMENT: 0-10

## 2024-10-24 ASSESSMENT — PAIN SCALES - GENERAL
PAINLEVEL_OUTOF10: 7
PAINLEVEL_OUTOF10: 6

## 2024-10-24 ASSESSMENT — ENCOUNTER SYMPTOMS
OCCASIONAL FEELINGS OF UNSTEADINESS: 0
LOSS OF SENSATION IN FEET: 0
DEPRESSION: 0

## 2024-10-24 ASSESSMENT — PAIN DESCRIPTION - DESCRIPTORS: DESCRIPTORS: SHARP;SHOOTING
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
## 2024-10-24 NOTE — PATIENT INSTRUCTIONS
Thank you for coming to see us today!     We are going to give you a referral for physical therapy   Please call central scheduling to make this appointment.     Please follow up with us as needed

## 2024-10-28 ENCOUNTER — OFFICE VISIT (OUTPATIENT)
Dept: CARDIOLOGY | Facility: CLINIC | Age: 74
End: 2024-10-28
Payer: MEDICARE

## 2024-10-28 VITALS
WEIGHT: 158.1 LBS | BODY MASS INDEX: 28.01 KG/M2 | HEIGHT: 63 IN | DIASTOLIC BLOOD PRESSURE: 114 MMHG | SYSTOLIC BLOOD PRESSURE: 155 MMHG

## 2024-10-28 DIAGNOSIS — I77.810 AORTIC ECTASIA, THORACIC (CMS-HCC): ICD-10-CM

## 2024-10-28 DIAGNOSIS — I50.32 CHRONIC HEART FAILURE WITH PRESERVED EJECTION FRACTION: ICD-10-CM

## 2024-10-28 DIAGNOSIS — I10 ESSENTIAL HYPERTENSION: ICD-10-CM

## 2024-10-28 DIAGNOSIS — I48.19 PERSISTENT ATRIAL FIBRILLATION (MULTI): Primary | ICD-10-CM

## 2024-10-28 PROCEDURE — 99215 OFFICE O/P EST HI 40 MIN: CPT | Performed by: STUDENT IN AN ORGANIZED HEALTH CARE EDUCATION/TRAINING PROGRAM

## 2024-10-28 PROCEDURE — 1125F AMNT PAIN NOTED PAIN PRSNT: CPT | Performed by: STUDENT IN AN ORGANIZED HEALTH CARE EDUCATION/TRAINING PROGRAM

## 2024-10-28 PROCEDURE — 1159F MED LIST DOCD IN RCRD: CPT | Performed by: STUDENT IN AN ORGANIZED HEALTH CARE EDUCATION/TRAINING PROGRAM

## 2024-10-28 PROCEDURE — 93005 ELECTROCARDIOGRAM TRACING: CPT | Performed by: STUDENT IN AN ORGANIZED HEALTH CARE EDUCATION/TRAINING PROGRAM

## 2024-10-28 PROCEDURE — G2211 COMPLEX E/M VISIT ADD ON: HCPCS | Performed by: STUDENT IN AN ORGANIZED HEALTH CARE EDUCATION/TRAINING PROGRAM

## 2024-10-28 PROCEDURE — 1160F RVW MEDS BY RX/DR IN RCRD: CPT | Performed by: STUDENT IN AN ORGANIZED HEALTH CARE EDUCATION/TRAINING PROGRAM

## 2024-10-28 PROCEDURE — 3077F SYST BP >= 140 MM HG: CPT | Performed by: STUDENT IN AN ORGANIZED HEALTH CARE EDUCATION/TRAINING PROGRAM

## 2024-10-28 PROCEDURE — 3080F DIAST BP >= 90 MM HG: CPT | Performed by: STUDENT IN AN ORGANIZED HEALTH CARE EDUCATION/TRAINING PROGRAM

## 2024-10-28 PROCEDURE — 1123F ACP DISCUSS/DSCN MKR DOCD: CPT | Performed by: STUDENT IN AN ORGANIZED HEALTH CARE EDUCATION/TRAINING PROGRAM

## 2024-10-28 PROCEDURE — 99205 OFFICE O/P NEW HI 60 MIN: CPT | Performed by: STUDENT IN AN ORGANIZED HEALTH CARE EDUCATION/TRAINING PROGRAM

## 2024-10-28 RX ORDER — SPIRONOLACTONE 25 MG/1
25 TABLET ORAL DAILY
Qty: 90 TABLET | Refills: 3 | Status: SHIPPED | OUTPATIENT
Start: 2024-10-28 | End: 2025-10-28

## 2024-10-28 ASSESSMENT — ENCOUNTER SYMPTOMS
OCCASIONAL FEELINGS OF UNSTEADINESS: 0
LOSS OF SENSATION IN FEET: 0
DEPRESSION: 0

## 2024-10-28 ASSESSMENT — PAIN SCALES - GENERAL: PAINLEVEL_OUTOF10: 6

## 2024-10-29 LAB
ATRIAL RATE: 113 BPM
Q ONSET: 215 MS
QRS COUNT: 18 BEATS
QRS DURATION: 78 MS
QT INTERVAL: 354 MS
QTC CALCULATION(BAZETT): 474 MS
QTC FREDERICIA: 430 MS
R AXIS: 269 DEGREES
T AXIS: 55 DEGREES
T OFFSET: 392 MS
VENTRICULAR RATE: 108 BPM

## 2024-11-02 DIAGNOSIS — E03.8 SUBCLINICAL HYPOTHYROIDISM: ICD-10-CM

## 2024-11-04 RX ORDER — LEVOTHYROXINE SODIUM 25 UG/1
37.5 TABLET ORAL DAILY
Qty: 135 TABLET | Refills: 0 | Status: SHIPPED | OUTPATIENT
Start: 2024-11-04

## 2024-11-21 ENCOUNTER — APPOINTMENT (OUTPATIENT)
Dept: BEHAVIORAL HEALTH | Facility: CLINIC | Age: 74
End: 2024-11-21
Payer: MEDICARE

## 2024-11-21 ENCOUNTER — APPOINTMENT (OUTPATIENT)
Dept: OBSTETRICS AND GYNECOLOGY | Facility: CLINIC | Age: 74
End: 2024-11-21
Payer: MEDICARE

## 2024-11-21 VITALS — SYSTOLIC BLOOD PRESSURE: 110 MMHG | HEIGHT: 63 IN | DIASTOLIC BLOOD PRESSURE: 60 MMHG | BODY MASS INDEX: 28.01 KG/M2

## 2024-11-21 DIAGNOSIS — F31.9 BIPOLAR 1 DISORDER (MULTI): ICD-10-CM

## 2024-11-21 DIAGNOSIS — Z01.419 WELL WOMAN EXAM: Primary | ICD-10-CM

## 2024-11-21 DIAGNOSIS — Z12.4 CERVICAL CANCER SCREENING: ICD-10-CM

## 2024-11-21 PROCEDURE — 3078F DIAST BP <80 MM HG: CPT | Performed by: OBSTETRICS & GYNECOLOGY

## 2024-11-21 PROCEDURE — G0101 CA SCREEN;PELVIC/BREAST EXAM: HCPCS | Performed by: OBSTETRICS & GYNECOLOGY

## 2024-11-21 PROCEDURE — 1159F MED LIST DOCD IN RCRD: CPT | Performed by: OBSTETRICS & GYNECOLOGY

## 2024-11-21 PROCEDURE — 1036F TOBACCO NON-USER: CPT | Performed by: OBSTETRICS & GYNECOLOGY

## 2024-11-21 PROCEDURE — 88175 CYTOPATH C/V AUTO FLUID REDO: CPT

## 2024-11-21 PROCEDURE — 1160F RVW MEDS BY RX/DR IN RCRD: CPT | Performed by: OBSTETRICS & GYNECOLOGY

## 2024-11-21 PROCEDURE — 3074F SYST BP LT 130 MM HG: CPT | Performed by: OBSTETRICS & GYNECOLOGY

## 2024-11-21 PROCEDURE — 1123F ACP DISCUSS/DSCN MKR DOCD: CPT | Performed by: PSYCHIATRY & NEUROLOGY

## 2024-11-21 PROCEDURE — 1126F AMNT PAIN NOTED NONE PRSNT: CPT | Performed by: OBSTETRICS & GYNECOLOGY

## 2024-11-21 PROCEDURE — 1123F ACP DISCUSS/DSCN MKR DOCD: CPT | Performed by: OBSTETRICS & GYNECOLOGY

## 2024-11-21 PROCEDURE — 99213 OFFICE O/P EST LOW 20 MIN: CPT | Performed by: PSYCHIATRY & NEUROLOGY

## 2024-11-21 PROCEDURE — 87624 HPV HI-RISK TYP POOLED RSLT: CPT

## 2024-11-21 ASSESSMENT — ENCOUNTER SYMPTOMS
FREQUENCY: 0
FLANK PAIN: 0
FEVER: 0
HEMATURIA: 0
FATIGUE: 0
SLEEP DISTURBANCE: 0
VOMITING: 0
COLOR CHANGE: 0
CHILLS: 0
NAUSEA: 0
ABDOMINAL PAIN: 0
ABDOMINAL DISTENTION: 0
BLOOD IN STOOL: 0
DIARRHEA: 0
APPETITE CHANGE: 0
UNEXPECTED WEIGHT CHANGE: 0
DYSURIA: 0
CONSTIPATION: 0
SHORTNESS OF BREATH: 0
BACK PAIN: 0

## 2024-11-21 ASSESSMENT — PAIN SCALES - GENERAL: PAINLEVEL_OUTOF10: 0-NO PAIN

## 2024-11-21 NOTE — PATIENT INSTRUCTIONS
Treatment Plan/Recommendations:   - Continue risperdal 2mg at bedtime.   - Healthy lifestyle.   - Follow up in 6 months or sooner if needed.    Brain-healthy lifestyle:   - Make sure your medical conditions (if any) such as diabetes, high blood pressure, high cholesterol, thyroid disease sleep apnea are optimally controlled.   - Use eyeglasses or hearing aids appropriately if needed.   - Eat a heart healthy diet (like a Mediterranean diet; lots of fruits and vegetables, fish; low fat;)  - Exercise regularly as tolerated.   - Maintain good sleep hygiene; avoid daytime naps; try to get 7 to 8 hours of continuous sleep at night.   - Stay mentally active - puzzles, word searches, books, playing cards.  - Stay socially active and engaged.
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
## 2024-11-21 NOTE — PROGRESS NOTES
"History Of Present Illness  Routine Gyn Exam  Poppy SALAZAR Donn here for routine WWE.  Pt is postmenopausal.  Denies spotting or bleeding.     Concerns: no Gyn concerns.     Medical and surgical histories reviewed with patient.   Pt has history of bipolar disorder, HTN, thyroid d/o, a-fib, arthritis.  Recently had a bad fall (August).       Gynecologic History  Postmenopausal.  Sexually active:  but not currently sexually active  .  Last Pap: unsure .   Last mammogram: .   Last Colonoscopy:  up to date .   Last DEXA: .     Obstetric History  OB History    Para Term  AB Living   1         1   SAB IAB Ectopic Multiple Live Births                  # Outcome Date GA Lbr Pedro/2nd Weight Sex Type Anes PTL Lv   1                  Review of Systems   Constitutional:  Negative for appetite change, chills, fatigue, fever and unexpected weight change.   Respiratory:  Negative for shortness of breath.    Cardiovascular:  Negative for chest pain.   Gastrointestinal:  Negative for abdominal distention, abdominal pain, blood in stool, constipation, diarrhea, nausea and vomiting.   Endocrine: Negative for cold intolerance and heat intolerance.   Genitourinary:  Negative for dyspareunia, dysuria, flank pain, frequency, genital sores, hematuria, menstrual problem, pelvic pain, urgency, vaginal bleeding, vaginal discharge and vaginal pain.   Musculoskeletal:  Negative for back pain.   Skin:  Negative for color change.   Psychiatric/Behavioral:  Negative for sleep disturbance.        /60 (BP Location: Right arm, Patient Position: Sitting)   Ht 1.6 m (5' 3\")   BMI 28.01 kg/m²      Physical Exam  Constitutional:       Appearance: Normal appearance.   HENT:      Head: Normocephalic and atraumatic.   Chest:   Breasts:     Right: Normal.      Left: Normal.   Abdominal:      General: Abdomen is flat.      Palpations: Abdomen is soft.      Tenderness: There is no abdominal tenderness.   Genitourinary:    "  General: Normal vulva.      Vagina: Normal.      Cervix: Normal.      Uterus: Normal.       Adnexa: Right adnexa normal and left adnexa normal.   Skin:     General: Skin is warm and dry.   Neurological:      Mental Status: She is alert and oriented to person, place, and time.   Psychiatric:         Mood and Affect: Mood normal.              Assessment/Plan         Routine Well Woman Exam Today  Discussed diet and exercise.   Reviewed routine health screenings.   Pap: pap done today   Recommend annual mammograms. Pt has mammogram order from PCP.  Currently up to date on colon cancer screening.                  Roshni Galeas MD

## 2024-11-25 NOTE — PROGRESS NOTES
Subjective      Chief Complaint   Patient presents with    Left Hand - Pain    Right Hand - Pain        No surgery found     HPI  This 74 year old established patient presents today for evaluation of bilateral hand pain. She states that her bilateral hand pain has been present for years. She denies any recent injury or trauma to either hand. She states that her bilateral hand pain (8/10) is worse with and aggravated by activity and is typically better when resting. She has tried OTC Tylenol and NSAIDS for pain management with no relief of symptoms. She currently rates her bilateral hand pain at 8/10. She is right hand dominant. She also notes left knee pain (7/10) that is worse with and aggravated bywalking and getting from a sit to a stand.     CARDIOLOGY:   Negative for chest pain, shortness of breath.   RESPIRATORY:   Negative for chest pain, shortness of breath.   MUSCULOSKELETAL:   See HPI for details.   NEUROLOGY:   Negative for tingling, numbness, weakness.    Objective    There were no vitals filed for this visit.    Physical Exam  GENERAL:          General Appearance:  This is a pleasant patient with appropriate affect, in no acute distress.   DERMATOLOGY:          Skin: skin at the neck, upper and lower back, and trunk is intact. There is no evidence of skin rash, skin breakdown or ulceration, or atrophic skin change.   EXTREMITIES:          Vascular:  Right, left hands and feet are warm with good color and pulses. Right and left calf and thigh are nontender and nonswollen.   NEUROLOGICAL:          Orientation:  Patient is alert and oriented to person, place, time and situation. Right and left upper and lower extremity motor and sensory examinations are intact.      MUSCULOSKELETAL: Neck: Nontender. No pain with range of motion. Right and left hands: there is no tenderness at the right or left wrists. No pain with gentle ROM of the left and right wrist. position appears satisfactory Finkelstein's is  negative. Phalen's is negative.  left and right hips: Nontender.  No pain with range of motion.Left knee: nontender. No pain with gentle ROM. No effusion present. McMurrays is negative. Anterior drawer and lachmans negative. Nontender in the left calf.     X-rays of the left and right wrist done and read in the office today do not show any definite evidence of acute displaced fracture.   previous x-rays of the left and right knees done on 10- show mild osteoarthritis and also chondrocalcinosis of both the right and left knees.  I reviewed these x-rays with the patient in the office today.  ECG 12 lead (Clinic Performed)    Result Date: 11/11/2024  Atrial fibrillation with rapid ventricular response Right superior axis deviation Pulmonary disease pattern Abnormal ECG When compared with ECG of 15-SEP-2022 07:00, Criteria for Septal infarct are no longer Present Confirmed by Felix Melendez ([id number]) on 11/11/2024 6:26:22 PM       Poppy was seen today for pain and pain.  Diagnoses and all orders for this visit:  Left wrist pain (Primary)  Contusion of left wrist, initial encounter  Left wrist sprain, initial encounter  Sprain of left knee, initial encounter  Chondrocalcinosis of left knee  Chondrocalcinosis of right knee  Osteoarthritis of left knee, unspecified osteoarthritis type  Osteoarthritis of right knee, unspecified osteoarthritis type    options are discussed with the patient in detail. The patient is instructed to keep her appointment for physical therapy to evaluate and treat with gentle strengthening and ROM exercises with modalities as needed. The patient is instructed regarding activity modification and risk for further injury with falling or trauma, ice, physician directed at home gentle strengthening and ROM exercises, and the appropriate use of Tylenol as needed for pain with its potential adverse reactions and side effects. The patient understands. return to see me as needed. Please note that this  report has been produced using speech recognition software.  It may contain errors related to grammar, punctuation or spelling.  Electronically signed, but not reviewed.    Martell Newby MD

## 2024-11-27 ENCOUNTER — OFFICE VISIT (OUTPATIENT)
Dept: ORTHOPEDIC SURGERY | Facility: CLINIC | Age: 74
End: 2024-11-27
Payer: MEDICARE

## 2024-11-27 ENCOUNTER — HOSPITAL ENCOUNTER (OUTPATIENT)
Dept: RADIOLOGY | Facility: CLINIC | Age: 74
Discharge: HOME | End: 2024-11-27
Payer: MEDICARE

## 2024-11-27 VITALS — HEIGHT: 63 IN | BODY MASS INDEX: 28 KG/M2 | WEIGHT: 158 LBS

## 2024-11-27 DIAGNOSIS — M11.261 CHONDROCALCINOSIS OF RIGHT KNEE: ICD-10-CM

## 2024-11-27 DIAGNOSIS — M11.262 CHONDROCALCINOSIS OF LEFT KNEE: ICD-10-CM

## 2024-11-27 DIAGNOSIS — S83.92XA SPRAIN OF LEFT KNEE, INITIAL ENCOUNTER: ICD-10-CM

## 2024-11-27 DIAGNOSIS — M17.12 OSTEOARTHRITIS OF LEFT KNEE, UNSPECIFIED OSTEOARTHRITIS TYPE: ICD-10-CM

## 2024-11-27 DIAGNOSIS — R52 PAIN: ICD-10-CM

## 2024-11-27 DIAGNOSIS — M17.11 OSTEOARTHRITIS OF RIGHT KNEE, UNSPECIFIED OSTEOARTHRITIS TYPE: ICD-10-CM

## 2024-11-27 DIAGNOSIS — S63.502A LEFT WRIST SPRAIN, INITIAL ENCOUNTER: ICD-10-CM

## 2024-11-27 DIAGNOSIS — M25.532 LEFT WRIST PAIN: Primary | ICD-10-CM

## 2024-11-27 DIAGNOSIS — S60.212A CONTUSION OF LEFT WRIST, INITIAL ENCOUNTER: ICD-10-CM

## 2024-11-27 PROCEDURE — 99213 OFFICE O/P EST LOW 20 MIN: CPT | Performed by: ORTHOPAEDIC SURGERY

## 2024-11-27 PROCEDURE — 73130 X-RAY EXAM OF HAND: CPT | Mod: 50

## 2024-11-27 ASSESSMENT — COLUMBIA-SUICIDE SEVERITY RATING SCALE - C-SSRS
2. HAVE YOU ACTUALLY HAD ANY THOUGHTS OF KILLING YOURSELF?: NO
6. HAVE YOU EVER DONE ANYTHING, STARTED TO DO ANYTHING, OR PREPARED TO DO ANYTHING TO END YOUR LIFE?: NO
1. IN THE PAST MONTH, HAVE YOU WISHED YOU WERE DEAD OR WISHED YOU COULD GO TO SLEEP AND NOT WAKE UP?: NO

## 2024-11-27 ASSESSMENT — LIFESTYLE VARIABLES
HOW OFTEN DO YOU HAVE SIX OR MORE DRINKS ON ONE OCCASION: NEVER
HAS A RELATIVE, FRIEND, DOCTOR, OR ANOTHER HEALTH PROFESSIONAL EXPRESSED CONCERN ABOUT YOUR DRINKING OR SUGGESTED YOU CUT DOWN: NO
HOW OFTEN DURING THE LAST YEAR HAVE YOU FAILED TO DO WHAT WAS NORMALLY EXPECTED FROM YOU BECAUSE OF DRINKING: NEVER
HOW OFTEN DO YOU HAVE A DRINK CONTAINING ALCOHOL: NEVER
HOW MANY STANDARD DRINKS CONTAINING ALCOHOL DO YOU HAVE ON A TYPICAL DAY: PATIENT DOES NOT DRINK
AUDIT-C TOTAL SCORE: 0
HOW OFTEN DURING THE LAST YEAR HAVE YOU FOUND THAT YOU WERE NOT ABLE TO STOP DRINKING ONCE YOU HAD STARTED: NEVER
SKIP TO QUESTIONS 9-10: 1
AUDIT TOTAL SCORE: 0
HOW OFTEN DURING THE LAST YEAR HAVE YOU BEEN UNABLE TO REMEMBER WHAT HAPPENED THE NIGHT BEFORE BECAUSE YOU HAD BEEN DRINKING: NEVER
HAVE YOU OR SOMEONE ELSE BEEN INJURED AS A RESULT OF YOUR DRINKING: NO
HOW OFTEN DURING THE LAST YEAR HAVE YOU HAD A FEELING OF GUILT OR REMORSE AFTER DRINKING: NEVER
HOW OFTEN DURING THE LAST YEAR HAVE YOU NEEDED AN ALCOHOLIC DRINK FIRST THING IN THE MORNING TO GET YOURSELF GOING AFTER A NIGHT OF HEAVY DRINKING: NEVER

## 2024-11-27 ASSESSMENT — PAIN DESCRIPTION - DESCRIPTORS: DESCRIPTORS: ACHING;SORE

## 2024-11-27 ASSESSMENT — PAIN SCALES - GENERAL
PAINLEVEL_OUTOF10: 4
PAINLEVEL_OUTOF10: 4

## 2024-11-27 ASSESSMENT — ENCOUNTER SYMPTOMS
LOSS OF SENSATION IN FEET: 0
OCCASIONAL FEELINGS OF UNSTEADINESS: 0
DEPRESSION: 0

## 2024-11-27 ASSESSMENT — PATIENT HEALTH QUESTIONNAIRE - PHQ9
2. FEELING DOWN, DEPRESSED OR HOPELESS: NOT AT ALL
1. LITTLE INTEREST OR PLEASURE IN DOING THINGS: NOT AT ALL
SUM OF ALL RESPONSES TO PHQ9 QUESTIONS 1 AND 2: 0

## 2024-11-27 ASSESSMENT — PAIN - FUNCTIONAL ASSESSMENT: PAIN_FUNCTIONAL_ASSESSMENT: 0-10

## 2024-11-29 DIAGNOSIS — E03.8 SUBCLINICAL HYPOTHYROIDISM: ICD-10-CM

## 2024-12-01 DIAGNOSIS — F31.9 BIPOLAR 1 DISORDER (MULTI): ICD-10-CM

## 2024-12-02 RX ORDER — LEVOTHYROXINE SODIUM 25 UG/1
37.5 TABLET ORAL DAILY
COMMUNITY
Start: 2024-12-02

## 2024-12-02 RX ORDER — RISPERIDONE 2 MG/1
2 TABLET ORAL NIGHTLY
Qty: 90 TABLET | Refills: 1 | Status: SHIPPED | OUTPATIENT
Start: 2024-12-02

## 2024-12-05 LAB
CYTOLOGY CMNT CVX/VAG CYTO-IMP: NORMAL
HPV HR 12 DNA GENITAL QL NAA+PROBE: NEGATIVE
HPV HR GENOTYPES PNL CVX NAA+PROBE: NEGATIVE
HPV16 DNA SPEC QL NAA+PROBE: NEGATIVE
HPV18 DNA SPEC QL NAA+PROBE: NEGATIVE
LAB AP HPV GENOTYPE QUESTION: YES
LAB AP HPV HR: NORMAL
LABORATORY COMMENT REPORT: NORMAL
PATH REPORT.TOTAL CANCER: NORMAL

## 2024-12-10 ENCOUNTER — HOSPITAL ENCOUNTER (OUTPATIENT)
Dept: RADIOLOGY | Facility: CLINIC | Age: 74
Discharge: HOME | End: 2024-12-10
Payer: MEDICARE

## 2024-12-10 DIAGNOSIS — Z78.0 ASYMPTOMATIC MENOPAUSAL STATE: ICD-10-CM

## 2024-12-10 PROCEDURE — 77080 DXA BONE DENSITY AXIAL: CPT

## 2024-12-10 PROCEDURE — 77080 DXA BONE DENSITY AXIAL: CPT | Performed by: RADIOLOGY

## 2024-12-11 ENCOUNTER — EVALUATION (OUTPATIENT)
Dept: PHYSICAL THERAPY | Facility: CLINIC | Age: 74
End: 2024-12-11
Payer: MEDICARE

## 2024-12-11 DIAGNOSIS — M25.561 BILATERAL CHRONIC KNEE PAIN: ICD-10-CM

## 2024-12-11 DIAGNOSIS — R26.89 OTHER ABNORMALITIES OF GAIT AND MOBILITY: Primary | ICD-10-CM

## 2024-12-11 DIAGNOSIS — M17.11 PRIMARY OSTEOARTHRITIS OF RIGHT KNEE: ICD-10-CM

## 2024-12-11 DIAGNOSIS — M25.562 BILATERAL CHRONIC KNEE PAIN: ICD-10-CM

## 2024-12-11 DIAGNOSIS — G89.29 BILATERAL CHRONIC KNEE PAIN: ICD-10-CM

## 2024-12-11 DIAGNOSIS — M17.12 PRIMARY OSTEOARTHRITIS OF LEFT KNEE: ICD-10-CM

## 2024-12-11 PROCEDURE — 97162 PT EVAL MOD COMPLEX 30 MIN: CPT | Mod: GP

## 2024-12-11 ASSESSMENT — PAIN DESCRIPTION - DESCRIPTORS: DESCRIPTORS: SHARP;THROBBING

## 2024-12-11 ASSESSMENT — PAIN SCALES - GENERAL: PAINLEVEL_OUTOF10: 7

## 2024-12-11 ASSESSMENT — PAIN - FUNCTIONAL ASSESSMENT: PAIN_FUNCTIONAL_ASSESSMENT: 0-10

## 2024-12-11 NOTE — PROGRESS NOTES
Physical Therapy Evaluation     Patient Name: Poppy Pop  MRN: [id number]  Today's Date: 12/11/2024  Time Calculation  Start Time: 1130  Stop Time: 1210  Time Calculation (min): 40 min  PT Evaluation Time Entry  PT Evaluation (Moderate) Time Entry: 40          Insurance Considerations: Payor: MEDICARE / Plan: MEDICARE PART A AND B / Product Type: *No Product type* /    12/10/2024: MEDICARE A/B, [place], [place], NO AUTH, ($0 USED PT/ST)     Problem List Items Addressed This Visit             ICD-10-CM    Osteoarthritis M19.90    Relevant Orders    Follow Up In Physical Therapy    Follow Up In Physical Therapy    Bilateral chronic knee pain M25.561, M25.562, G89.29    Relevant Orders    Follow Up In Physical Therapy     Other Visit Diagnoses         Codes    Other abnormalities of gait and mobility    -  Primary R26.89    Relevant Orders    Follow Up In Physical Therapy            Subjective  /General:     Patient reported hx of current condition: The pt presents stating that on 8/31/24 she tripped on a sidewalk and fell forward landing on hands and knees, but then had to roll onto her left side. She notes that she has always had some back and knee pain, but the ankle pain began after the fall. She feels that the muscles in her legs are not normal.     She also reports bilat hand pain from the fall.    Aggravating factors: getting up from sitting (has to push with Ues), ankles hurt with walking (she got a diuretic from her cardiologist and that has been helping)  Relieving factors: standing , shifting weight back and forth, massage (goes to massage therapist)    Precautions/ Red Flags:       Red flags   Hx of CA No   Pacemaker/ Electronic Implant No   Saddle Anesthesia No   Bowel/Bladder Changes No   Sudden Weakness No   Recent Falls (within last 6mo) Yes  as noted in subjective     Relevant PMH:  HTN  Bipolar 1 disorder  Fibromyalgia  OA  Major neurocognitive disorder  Memory loss    Pain:  Pain Assessment  Pain  "Assessment: 0-10  0-10 (Numeric) Pain Score: 7  Pain Location: Ankle (L knee, & lower back)  Pain Orientation: Left, Right  Pain Radiating Towards: \"radiates down to the feet when her muscles are tight\"  Pain Descriptors: Sharp, Throbbing  Home Living:    Lives with: Spouse  Home type: House  Stairs: Yes - lives all on one floor  Occupation: retired  Prior Function Per Pt/Caregiver Report:  Prior Function Per Pt/Caregiver Report  Prior Function Comments: Uses SPC when icy or with long distances (The pt also reports utilizing a \"wiffle ball bat with a  on the end\" as well as a walking stick as other ADs)    Objective   Posture:  Posture Comment: Pt stands with forward head, rounded shoulders, and increased upper thoracic kyphosis  Range of Motion:     Strength:     Flexibility:  Flexibility Comment: tightness in piriformis and hamstrings  Palpation:     Special Tests:     Gait:  Gait Comment: Pt ambulates into the clinic indep without AD. She ambulates with flexed trunk posture, slow dorene, and shuffling feet (which may be related to her footwear as she wore slide on sandals)  Balance:     Stairs:     Bed Mobility:     Transfers:  Transfers Comment: pt utilizes UE support for assist with transfers  Other:  Comment: The pts bilat LEs demonsrate edema, mostly at the ankles and feet     Lumbar AROM Range   Flexion 25%   Extension 50%   R sidebend 50% *   L sidebend 50% *     Hip MMT L R   Hip Flexion 4-/5 4-/5   External Rotation 4-/5 4-/5   Internal Rotation 4-/5 4-/5      Knee MMT L R   Knee Flexion 4-/5 4-/5   Knee Extension 3+/5 3+/5      Ankle MMT L R   Dorsiflexion 4+/5 4+/5   Plantarflexion 4+/5 4+/5      Outcome Measures:  Timed Up and Go Test  TUG Comment: 14.54    Other Measures  5x Sit to Stand: 26.44\"  Other Outcome Measures: WOMAC 73     Assessment:  PT Assessment Results: Decreased strength, Decreased range of motion, Decreased endurance, Impaired balance, Decreased mobility, Decreased safety " awareness, Decreased cognition, Impaired judgement, Impaired hearing, Pain  Rehab Prognosis: Fair  Barriers to Participation:  (The pt is easily distracted and difficult to redirect, which could limit participation in sessions)    Pt is a 74 y.o. female who presents with signs and symptoms consistent with widespread degenerative changes. The current impairments have led to functional limitations that include: walking and getting up from sitting down. Pt would benefit from skilled physical therapy intervention to improve impairments and facilitate return to prior function.    Complexity of Evaluation: Moderate    Based on the history including personal factors and/or comorbidities, examination of body systems including body structures and function, activity limitations, and/or participation restrictions, as well as clinical presentation, patient meets criteria for a Moderate complexity evaluation.    Plan:  Treatment/Interventions: Aquatic therapy  PT Plan: Skilled PT  PT Frequency: 1 time per week  Duration: 10 total visits  Onset Date: 12/11/24  Certification Period Start Date: 12/11/24  Certification Period End Date: 03/11/25  Number of Treatments Authorized: [place]/MC cap  Rehab Potential: Fair  Plan of Care Agreement: Patient    Plan for next visit: Initiate aquatics -- pt is easily distracted and difficult to redirect    OP EDUCATION:  Outpatient Education  Individual(s) Educated: Patient  Education Provided: Anatomy, POC (pool tour)  Risk and Benefits Discussed with Patient/Caregiver/Other: yes  Patient/Caregiver Demonstrated Understanding: yes  Plan of Care Discussed and Agreed Upon: yes  Patient Response to Education: Patient/Caregiver Verbalized Understanding of Information, Patient/Caregiver Asked Appropriate Questions    Today's Treatment:  No treatment billed today  HEP to be completed daily, exercises include:  Aquatics only at this time    Goals:  Active       PT Problem       PT STG       Start:  12/11/24  "   Expected End:  01/25/25       - Pt will complete the HEP with <3 verbal cues for correction,   - Pt will demonstrate 2pt improvement on the NPRS, allowing for improved tolerance of functional activities,   - Pt will demonstrate ability to perform 6 pool steps with recipricol pattern and bilat UE use  - Pt will demonstrate at least 4/5 MMT grading throughout LE musculature, allowing for appropriate muscle recruitment during daily activities. ,          PT LTG       Start:  12/11/24    Expected End:  03/11/25       - Pt will be independent in HEP & symptom management,   - Pt will demonstrate 4pt improvement on the NPRS pain scale, allowing for improved tolerance of functional activities.,   - Pt will improve 5x sit to stand to <16\" in order to demonstrate decreased risk of falls,   - Pt will demonstrate improved WOMAC score by 11 points (MCID) in order to demonstrate improved functional mobility.                             "

## 2024-12-13 ENCOUNTER — LAB (OUTPATIENT)
Dept: LAB | Facility: LAB | Age: 74
End: 2024-12-13
Payer: MEDICARE

## 2024-12-13 DIAGNOSIS — I48.19 PERSISTENT ATRIAL FIBRILLATION (MULTI): ICD-10-CM

## 2024-12-13 DIAGNOSIS — I50.32 CHRONIC HEART FAILURE WITH PRESERVED EJECTION FRACTION: ICD-10-CM

## 2024-12-13 LAB
ALBUMIN SERPL BCP-MCNC: 4.1 G/DL (ref 3.4–5)
ANION GAP SERPL CALC-SCNC: 13 MMOL/L (ref 10–20)
BUN SERPL-MCNC: 17 MG/DL (ref 6–23)
CALCIUM SERPL-MCNC: 9.7 MG/DL (ref 8.6–10.6)
CHLORIDE SERPL-SCNC: 100 MMOL/L (ref 98–107)
CO2 SERPL-SCNC: 33 MMOL/L (ref 21–32)
CREAT SERPL-MCNC: 0.9 MG/DL (ref 0.5–1.05)
EGFRCR SERPLBLD CKD-EPI 2021: 66 ML/MIN/1.73M*2
GLUCOSE SERPL-MCNC: 88 MG/DL (ref 74–99)
PHOSPHATE SERPL-MCNC: 3.4 MG/DL (ref 2.5–4.9)
POTASSIUM SERPL-SCNC: 4.7 MMOL/L (ref 3.5–5.3)
SODIUM SERPL-SCNC: 141 MMOL/L (ref 136–145)

## 2024-12-13 PROCEDURE — 36415 COLL VENOUS BLD VENIPUNCTURE: CPT

## 2024-12-13 PROCEDURE — 80069 RENAL FUNCTION PANEL: CPT

## 2024-12-16 ENCOUNTER — OFFICE VISIT (OUTPATIENT)
Dept: CARDIOLOGY | Facility: CLINIC | Age: 74
End: 2024-12-16
Payer: MEDICARE

## 2024-12-16 VITALS
RESPIRATION RATE: 18 BRPM | SYSTOLIC BLOOD PRESSURE: 139 MMHG | WEIGHT: 153 LBS | OXYGEN SATURATION: 93 % | BODY MASS INDEX: 27.1 KG/M2 | HEART RATE: 91 BPM | DIASTOLIC BLOOD PRESSURE: 93 MMHG

## 2024-12-16 DIAGNOSIS — I11.0 BENIGN HYPERTENSIVE HEART DISEASE WITH HEART FAILURE: ICD-10-CM

## 2024-12-16 DIAGNOSIS — I50.32 CHRONIC HEART FAILURE WITH PRESERVED EJECTION FRACTION: Primary | ICD-10-CM

## 2024-12-16 DIAGNOSIS — I77.810 AORTIC ECTASIA, THORACIC (CMS-HCC): ICD-10-CM

## 2024-12-16 DIAGNOSIS — I48.19 PERSISTENT ATRIAL FIBRILLATION (MULTI): ICD-10-CM

## 2024-12-16 PROCEDURE — 99215 OFFICE O/P EST HI 40 MIN: CPT | Performed by: STUDENT IN AN ORGANIZED HEALTH CARE EDUCATION/TRAINING PROGRAM

## 2024-12-16 PROCEDURE — 1123F ACP DISCUSS/DSCN MKR DOCD: CPT | Performed by: STUDENT IN AN ORGANIZED HEALTH CARE EDUCATION/TRAINING PROGRAM

## 2024-12-16 PROCEDURE — 1159F MED LIST DOCD IN RCRD: CPT | Performed by: STUDENT IN AN ORGANIZED HEALTH CARE EDUCATION/TRAINING PROGRAM

## 2024-12-16 PROCEDURE — 1160F RVW MEDS BY RX/DR IN RCRD: CPT | Performed by: STUDENT IN AN ORGANIZED HEALTH CARE EDUCATION/TRAINING PROGRAM

## 2024-12-16 PROCEDURE — G2211 COMPLEX E/M VISIT ADD ON: HCPCS | Performed by: STUDENT IN AN ORGANIZED HEALTH CARE EDUCATION/TRAINING PROGRAM

## 2024-12-16 RX ORDER — METOPROLOL SUCCINATE 100 MG/1
100 TABLET, EXTENDED RELEASE ORAL DAILY
Qty: 90 TABLET | Refills: 3 | Status: SHIPPED | OUTPATIENT
Start: 2024-12-16 | End: 2025-12-16
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
## 2024-12-17 PROBLEM — I11.0 BENIGN HYPERTENSIVE HEART DISEASE WITH HEART FAILURE: Status: ACTIVE | Noted: 2024-12-17

## 2024-12-17 PROBLEM — I10 ESSENTIAL HYPERTENSION: Status: RESOLVED | Noted: 2023-08-24 | Resolved: 2024-12-17

## 2024-12-17 NOTE — ASSESSMENT & PLAN NOTE
- Better controlled in prior assessments, today higher on usual treatment  - Blood pressure control at home to reassess therapy

## 2025-01-03 ENCOUNTER — TREATMENT (OUTPATIENT)
Dept: PHYSICAL THERAPY | Facility: CLINIC | Age: 75
End: 2025-01-03
Payer: MEDICARE

## 2025-01-03 DIAGNOSIS — R26.89 OTHER ABNORMALITIES OF GAIT AND MOBILITY: ICD-10-CM

## 2025-01-03 DIAGNOSIS — M17.11 PRIMARY OSTEOARTHRITIS OF RIGHT KNEE: ICD-10-CM

## 2025-01-03 DIAGNOSIS — M17.12 PRIMARY OSTEOARTHRITIS OF LEFT KNEE: ICD-10-CM

## 2025-01-03 DIAGNOSIS — M25.561 BILATERAL CHRONIC KNEE PAIN: ICD-10-CM

## 2025-01-03 DIAGNOSIS — M25.562 BILATERAL CHRONIC KNEE PAIN: ICD-10-CM

## 2025-01-03 DIAGNOSIS — G89.29 BILATERAL CHRONIC KNEE PAIN: ICD-10-CM

## 2025-01-03 PROCEDURE — 97113 AQUATIC THERAPY/EXERCISES: CPT | Mod: GP

## 2025-01-03 ASSESSMENT — PAIN SCALES - GENERAL: PAINLEVEL_OUTOF10: 8

## 2025-01-03 NOTE — PROGRESS NOTES
"    Physical Therapy Treatment    Patient Name: Poppy Pop  MRN: [id number]  Today's Date: 1/3/2025  Time Calculation  Start Time: 1140  Stop Time: 1214  Time Calculation (min): 34 min     PT Therapeutic Procedures Time Entry  Aquatic Therapy Time Entry: 34    Pt arrived late and still needed to change for the pool. Shortened session performed as a result.     Visit Number:  2 (including evaluation)  Planned total visits: 10  Visit Authorized/ Insurance Considerations:  12/10/2024: MEDICARE A/B, [employer], [place], NO AUTH, ($0 USED PT/ST)     Current Problem  Problem List Items Addressed This Visit             ICD-10-CM    Osteoarthritis M19.90    Bilateral chronic knee pain M25.561, M25.562, G89.29     Other Visit Diagnoses         Codes    Other abnormalities of gait and mobility     R26.89          Precautions       Pain  0-10 (Numeric) Pain Score: 8 (\"The pain is high\")    Subjective/General   The pt returns to the clinic excited to get into the pool. She notes that she will be seeing her massotherapist tomorrow.         Objective  Pt ambulates with flexed trunk posture and short shuffling steps on the pool deck.   Pt is very difficult to redirect and requires frequent redirection to continue exercises.     Treatment - Aquatic Therapy:    4' depth - walk across pool forward/backward/side step x 3 laps each    4' depth - at HR with B/L UE support and cues for TrA bracing:  - Heel raise x 10 reps  - Toe raise x 10 reps  - Hip ABD/EXT/SLR x 10 reps  - HS curls x 10 reps  - Mini-squats x 10 reps    4' depth - March across pool with noodle    Current HEP:  Aquatics only at this time    OP Education:       Assessment:     Pt's response to treatment:  Fair - the pt completed the exercises with fair to poor follow-through despite numerous attempts at reinstruction. The pt requires max redirection and cues for staying on tax throughout the entirety of the session.   Areas of improvements:  none  Limitations/deficits:  ability to " "follow instructions and stay on task; pain levels    Pain end of session:  6/10 \"feels looser and more relaxed\"    Plan:  OP PT Plan  Treatment/Interventions: Aquatic therapy  PT Plan: Skilled PT  PT Frequency: 1 time per week  Duration: 10 total visits  Onset Date: 12/11/24  Certification Period Start Date: 12/11/24  Certification Period End Date: 03/11/25  Number of Treatments Authorized: [place]/MC cap  Rehab Potential: Fair  Plan of Care Agreement: Patient  Continue with current POC/no changes    Assessment of current progress against goals:  Insufficient treatment time to assess progress  Goals:  Active       PT Problem       PT STG       Start:  12/11/24    Expected End:  01/25/25       - Pt will complete the HEP with <3 verbal cues for correction,   - Pt will demonstrate 2pt improvement on the NPRS, allowing for improved tolerance of functional activities,   - Pt will demonstrate ability to perform 6 pool steps with recipricol pattern and bilat UE use  - Pt will demonstrate at least 4/5 MMT grading throughout LE musculature, allowing for appropriate muscle recruitment during daily activities. ,          PT LTG       Start:  12/11/24    Expected End:  03/11/25       - Pt will be independent in HEP & symptom management,   - Pt will demonstrate 4pt improvement on the NPRS pain scale, allowing for improved tolerance of functional activities.,   - Pt will improve 5x sit to stand to <16\" in order to demonstrate decreased risk of falls,   - Pt will demonstrate improved WOMAC score by 11 points (MCID) in order to demonstrate improved functional mobility.                      "

## 2025-01-08 ENCOUNTER — TREATMENT (OUTPATIENT)
Dept: PHYSICAL THERAPY | Facility: CLINIC | Age: 75
End: 2025-01-08
Payer: MEDICARE

## 2025-01-08 DIAGNOSIS — G89.29 BILATERAL CHRONIC KNEE PAIN: ICD-10-CM

## 2025-01-08 DIAGNOSIS — M17.11 PRIMARY OSTEOARTHRITIS OF RIGHT KNEE: ICD-10-CM

## 2025-01-08 DIAGNOSIS — M25.562 BILATERAL CHRONIC KNEE PAIN: ICD-10-CM

## 2025-01-08 DIAGNOSIS — R26.89 OTHER ABNORMALITIES OF GAIT AND MOBILITY: ICD-10-CM

## 2025-01-08 DIAGNOSIS — M17.12 PRIMARY OSTEOARTHRITIS OF LEFT KNEE: ICD-10-CM

## 2025-01-08 DIAGNOSIS — M25.561 BILATERAL CHRONIC KNEE PAIN: ICD-10-CM

## 2025-01-08 PROCEDURE — 97113 AQUATIC THERAPY/EXERCISES: CPT | Mod: GP

## 2025-01-08 ASSESSMENT — PAIN SCALES - GENERAL: PAINLEVEL_OUTOF10: 7

## 2025-01-08 NOTE — PROGRESS NOTES
"    Physical Therapy Treatment    Patient Name: Poppy Pop  MRN: [id number]  Today's Date: 1/8/2025  Time Calculation  Start Time: 0807  Stop Time: 0841  Time Calculation (min): 34 min     PT Therapeutic Procedures Time Entry  Aquatic Therapy Time Entry: 34    Pt arrived on time, but still needed to change for the pool. Shortened session performed as a result.     Visit Number:  3 (including evaluation)  Planned total visits: 10  Visit Authorized/ Insurance Considerations:  *S 2025 VERIFIED - MEDICARE A/B - NO AUTH / $257 DED not met / [place]IS[place] / $0 used 2025 pt/st - per RTE 2) [employer] MC SUPP ACTIVE    Current Problem  Problem List Items Addressed This Visit             ICD-10-CM    Osteoarthritis M19.90    Bilateral chronic knee pain M25.561, M25.562, G89.29     Other Visit Diagnoses         Codes    Other abnormalities of gait and mobility     R26.89            Precautions  Precautions  Precautions Comment: falls    Pain  0-10 (Numeric) Pain Score: 7 \"mainly the knees and ankles - L side is bad\"    Subjective/General   The pt returns to the clinic stating \"I have been wearing a nicotine patch, so it really takes the pain away, it's probably around a 7 now, but it would be a 10 without it\"        Objective  Pt ambulates with flexed trunk posture and short shuffling steps on the pool deck.   Pt is very difficult to redirect and requires frequent redirection to continue exercises.     Treatment - Aquatic Therapy:    4' depth - walk across pool forward/backward/side step x 3 laps each    4' depth - at HR with B/L UE support and cues for TrA bracing:  - Heel raise x 10 reps  - Toe raise x 10 reps  - Hip ABD/EXT/SLR x 10 reps  - HS curls x 10 reps  - Mini-squats x 10 reps    4' depth - March across pool with noodle x3 laps    4' depth - with noodle:  - Trunk rotation x 10 reps  - Push/pull x 10 reps  - Straight arm push downs x 10 reps  Repeated sequence twice     Current HEP:  Aquatics only at this time    OP " "Education:       Assessment:     Pt's response to treatment:  Fair - The pts level of participation was slightly improved today with slightly less redirection needed throughout the session. This allowed for an increase in the amount of exercise performed.   Areas of improvements:  slight improvement on ability to stay on task  Limitations/deficits:  pain levels    Pain end of session:  5/10    Plan:  OP PT Plan  Treatment/Interventions: Aquatic therapy  PT Plan: Skilled PT  PT Frequency: 1 time per week  Duration: 10 total visits  Onset Date: 12/11/24  Certification Period Start Date: 12/11/24  Certification Period End Date: 03/11/25  Number of Treatments Authorized: [place]/LENNY cap  Rehab Potential: Fair  Plan of Care Agreement: Patient  Continue with current POC/no changes    Assessment of current progress against goals:  Insufficient treatment time to assess progress  Goals:  Active       PT Problem       PT STG       Start:  12/11/24    Expected End:  01/25/25       - Pt will complete the HEP with <3 verbal cues for correction,   - Pt will demonstrate 2pt improvement on the NPRS, allowing for improved tolerance of functional activities,   - Pt will demonstrate ability to perform 6 pool steps with recipricol pattern and bilat UE use  - Pt will demonstrate at least 4/5 MMT grading throughout LE musculature, allowing for appropriate muscle recruitment during daily activities. ,          PT LTG       Start:  12/11/24    Expected End:  03/11/25       - Pt will be independent in HEP & symptom management,   - Pt will demonstrate 4pt improvement on the NPRS pain scale, allowing for improved tolerance of functional activities.,   - Pt will improve 5x sit to stand to <16\" in order to demonstrate decreased risk of falls,   - Pt will demonstrate improved WOMAC score by 11 points (MCID) in order to demonstrate improved functional mobility.                      "

## 2025-01-15 ENCOUNTER — TREATMENT (OUTPATIENT)
Dept: PHYSICAL THERAPY | Facility: CLINIC | Age: 75
End: 2025-01-15
Payer: MEDICARE

## 2025-01-15 DIAGNOSIS — M25.562 BILATERAL CHRONIC KNEE PAIN: ICD-10-CM

## 2025-01-15 DIAGNOSIS — M25.561 BILATERAL CHRONIC KNEE PAIN: ICD-10-CM

## 2025-01-15 DIAGNOSIS — M17.11 PRIMARY OSTEOARTHRITIS OF RIGHT KNEE: ICD-10-CM

## 2025-01-15 DIAGNOSIS — M17.12 PRIMARY OSTEOARTHRITIS OF LEFT KNEE: ICD-10-CM

## 2025-01-15 DIAGNOSIS — G89.29 BILATERAL CHRONIC KNEE PAIN: ICD-10-CM

## 2025-01-15 DIAGNOSIS — R26.89 OTHER ABNORMALITIES OF GAIT AND MOBILITY: ICD-10-CM

## 2025-01-15 PROCEDURE — 97113 AQUATIC THERAPY/EXERCISES: CPT | Mod: GP,CQ

## 2025-01-15 ASSESSMENT — PAIN SCALES - GENERAL: PAINLEVEL_OUTOF10: 9

## 2025-01-15 NOTE — PROGRESS NOTES
"    Physical Therapy Treatment    Patient Name: Poppy Pop  MRN: [id number]  Today's Date: 1/15/2025  Time Calculation  Start Time: 1300  Stop Time: 1344  Time Calculation (min): 44 min     PT Therapeutic Procedures Time Entry  Aquatic Therapy Time Entry: 40        Visit Number:  4 (including evaluation)  Planned total visits: 10  Visit Authorized/ Insurance Considerations:  *S 2025 VERIFIED - MEDICARE A/B - NO AUTH / $257 DED not met / [place] / $0 used 2025 pt/st - per RTE 2) [employer] MC SUPP ACTIVE    Current Problem  Problem List Items Addressed This Visit             ICD-10-CM    Osteoarthritis M19.90    Bilateral chronic knee pain M25.561, M25.562, G89.29     Other Visit Diagnoses         Codes    Other abnormalities of gait and mobility     R26.89              Precautions  Precautions  Precautions Comment: falls    Pain  9/10    Subjective/General  Patient states \" my pain is in both of my knees and hands and also in my arm all the way up to my shoulder.\"         Objective  Pt ambulates with flexed trunk posture and short shuffling steps on the pool deck.   Pt continues to be very difficult to redirect and requires frequent redirection to continue exercises.     Treatment - Aquatic Therapy:    4' depth - walk across pool forward/backward/side step x 3 laps each    4' depth - at HR with B/L UE support and cues for TrA bracing:  - Heel raise x 15 reps  - Toe raise x 15 reps  - Hip ABD/EXT/SLR x 12 reps  - HS curls x 12 reps  - Mini-squats x 12 reps    4' depth - March across pool with noodle x3 laps    4' depth - with noodle:  - Trunk rotation x 12 reps  - Push/pull x 12 reps  - Straight arm push downs x 12 reps  Repeated sequence twice     Bench:  Bicycle   Ankle circles R/L  ccw, cw x20 each;  pumps x20  LAQs 2 x10  Hip flexion x10    Current HEP:  Aquatics only at this time    OP Education:   none    Assessment:     Pt's response to treatment:  Fair - Patient appeared less distracted requiring less " "redirection to stay on She was able tolerate new exercises on the bench.  Her most challenging exercises were the noodle straight arm push downs and L ankle CW circles secondary to increased discomfort.    Areas of improvements:  slight improvement on ability to stay on task  Limitations/deficits:  pain levels    Pain end of session:  6/10    Plan:     Continue with current POC/no changes    Assessment of current progress against goals:  Insufficient treatment time to assess progress  Goals:  Active       PT Problem       PT STG       Start:  12/11/24    Expected End:  01/25/25       - Pt will complete the HEP with <3 verbal cues for correction,   - Pt will demonstrate 2pt improvement on the NPRS, allowing for improved tolerance of functional activities,   - Pt will demonstrate ability to perform 6 pool steps with recipricol pattern and bilat UE use  - Pt will demonstrate at least 4/5 MMT grading throughout LE musculature, allowing for appropriate muscle recruitment during daily activities. ,          PT LTG       Start:  12/11/24    Expected End:  03/11/25       - Pt will be independent in HEP & symptom management,   - Pt will demonstrate 4pt improvement on the NPRS pain scale, allowing for improved tolerance of functional activities.,   - Pt will improve 5x sit to stand to <16\" in order to demonstrate decreased risk of falls,   - Pt will demonstrate improved WOMAC score by 11 points (MCID) in order to demonstrate improved functional mobility.                      "

## 2025-01-22 ENCOUNTER — APPOINTMENT (OUTPATIENT)
Dept: PHYSICAL THERAPY | Facility: CLINIC | Age: 75
End: 2025-01-22
Payer: MEDICARE

## 2025-01-29 ENCOUNTER — TREATMENT (OUTPATIENT)
Dept: PHYSICAL THERAPY | Facility: CLINIC | Age: 75
End: 2025-01-29
Payer: MEDICARE

## 2025-01-29 DIAGNOSIS — G89.29 BILATERAL CHRONIC KNEE PAIN: ICD-10-CM

## 2025-01-29 DIAGNOSIS — M25.562 BILATERAL CHRONIC KNEE PAIN: ICD-10-CM

## 2025-01-29 DIAGNOSIS — M17.12 PRIMARY OSTEOARTHRITIS OF LEFT KNEE: ICD-10-CM

## 2025-01-29 DIAGNOSIS — R26.89 OTHER ABNORMALITIES OF GAIT AND MOBILITY: ICD-10-CM

## 2025-01-29 DIAGNOSIS — M17.11 PRIMARY OSTEOARTHRITIS OF RIGHT KNEE: ICD-10-CM

## 2025-01-29 DIAGNOSIS — M25.561 BILATERAL CHRONIC KNEE PAIN: ICD-10-CM

## 2025-01-29 PROCEDURE — 97113 AQUATIC THERAPY/EXERCISES: CPT | Mod: GP

## 2025-01-29 NOTE — PROGRESS NOTES
"    Physical Therapy Treatment    Patient Name: Poppy Pop  MRN: [id number]  Today's Date: 1/29/2025  Time Calculation  Start Time: 1305  Stop Time: 1345  Time Calculation (min): 40 min     PT Therapeutic Procedures Time Entry  Aquatic Therapy Time Entry: 40        Visit Number:  5 (including evaluation)  Planned total visits: 10  Visit Authorized/ Insurance Considerations:  *S 2025 VERIFIED - MEDICARE A/B - NO AUTH / $257 DED not met / [place]ISITS / $0 used 2025 pt/st - per RTE 2) [employer] MC SUPP ACTIVE    Current Problem  Problem List Items Addressed This Visit             ICD-10-CM    Osteoarthritis M19.90    Bilateral chronic knee pain M25.561, M25.562, G89.29     Other Visit Diagnoses         Codes    Other abnormalities of gait and mobility     R26.89                Precautions       Pain  \"10/10\"    Subjective/General  The pt returns stating that her pain was 10 until she put a pain patch on and it came down to about a 7.       Objective  Pt ambulates with flexed trunk posture and short shuffling steps on the pool deck.   Pt continues to be very difficult to redirect and requires frequent redirection to continue exercises.     Treatment - Aquatic Therapy:    4' depth - walk across pool forward/backward/side step x 3 laps each    4' depth - at HR with B/L UE support and cues for TrA bracing:  - Heel raise x 15 reps  - Toe raise x 15 reps  - Hip ABD/EXT/SLR x 12 reps  - HS curls x 12 reps  - Mini-squats x 12 reps    4' depth - March across pool with noodle x3 laps    4' depth - with noodle:  - Trunk rotation x 20 reps  - Push/pull x 20 reps  - Straight arm push downs x 20 reps  Repeated sequence twice     Current HEP:  Aquatics only at this time    OP Education:   none    Assessment:     Pt's response to treatment:  Fair - The pt continues to require max redirection and is largely distracted throughout the session, leading to less focus on her form.     Areas of improvements:  none  Limitations/deficits:  pain " "levels    Pain end of session:  6/10    Plan:     Continue with current POC/no changes    Assessment of current progress against goals:  Insufficient treatment time to assess progress  Goals:  Active       PT Problem       PT STG       Start:  12/11/24    Expected End:  01/25/25       - Pt will complete the HEP with <3 verbal cues for correction,   - Pt will demonstrate 2pt improvement on the NPRS, allowing for improved tolerance of functional activities,   - Pt will demonstrate ability to perform 6 pool steps with recipricol pattern and bilat UE use  - Pt will demonstrate at least 4/5 MMT grading throughout LE musculature, allowing for appropriate muscle recruitment during daily activities. ,          PT LTG       Start:  12/11/24    Expected End:  03/11/25       - Pt will be independent in HEP & symptom management,   - Pt will demonstrate 4pt improvement on the NPRS pain scale, allowing for improved tolerance of functional activities.,   - Pt will improve 5x sit to stand to <16\" in order to demonstrate decreased risk of falls,   - Pt will demonstrate improved WOMAC score by 11 points (MCID) in order to demonstrate improved functional mobility.                      "

## 2025-02-05 ENCOUNTER — APPOINTMENT (OUTPATIENT)
Dept: PHYSICAL THERAPY | Facility: CLINIC | Age: 75
End: 2025-02-05
Payer: MEDICARE

## 2025-02-11 ENCOUNTER — HOSPITAL ENCOUNTER (OUTPATIENT)
Dept: RADIOLOGY | Facility: CLINIC | Age: 75
Discharge: HOME | End: 2025-02-11
Payer: MEDICARE

## 2025-02-11 VITALS — HEIGHT: 63 IN | WEIGHT: 153 LBS | BODY MASS INDEX: 27.11 KG/M2

## 2025-02-11 DIAGNOSIS — Z12.31 ENCOUNTER FOR SCREENING MAMMOGRAM FOR BREAST CANCER: ICD-10-CM

## 2025-02-11 PROCEDURE — 77067 SCR MAMMO BI INCL CAD: CPT

## 2025-02-11 PROCEDURE — 77063 BREAST TOMOSYNTHESIS BI: CPT | Performed by: RADIOLOGY

## 2025-02-11 PROCEDURE — 77067 SCR MAMMO BI INCL CAD: CPT | Performed by: RADIOLOGY

## 2025-02-12 ENCOUNTER — APPOINTMENT (OUTPATIENT)
Dept: PHYSICAL THERAPY | Facility: CLINIC | Age: 75
End: 2025-02-12
Payer: MEDICARE

## 2025-02-26 ENCOUNTER — APPOINTMENT (OUTPATIENT)
Dept: PHYSICAL THERAPY | Facility: CLINIC | Age: 75
End: 2025-02-26
Payer: MEDICARE

## 2025-03-05 ENCOUNTER — APPOINTMENT (OUTPATIENT)
Dept: PHYSICAL THERAPY | Facility: CLINIC | Age: 75
End: 2025-03-05
Payer: MEDICARE

## 2025-05-12 ENCOUNTER — APPOINTMENT (OUTPATIENT)
Dept: PRIMARY CARE | Facility: CLINIC | Age: 75
End: 2025-05-12
Payer: MEDICARE

## 2025-05-12 VITALS
OXYGEN SATURATION: 94 % | WEIGHT: 161 LBS | DIASTOLIC BLOOD PRESSURE: 86 MMHG | SYSTOLIC BLOOD PRESSURE: 126 MMHG | BODY MASS INDEX: 28.53 KG/M2 | HEART RATE: 99 BPM | HEIGHT: 63 IN

## 2025-05-12 DIAGNOSIS — F31.9 BIPOLAR 1 DISORDER (MULTI): ICD-10-CM

## 2025-05-12 DIAGNOSIS — R30.0 DYSURIA: ICD-10-CM

## 2025-05-12 DIAGNOSIS — K59.09 CHRONIC CONSTIPATION: Primary | ICD-10-CM

## 2025-05-12 DIAGNOSIS — E03.8 SUBCLINICAL HYPOTHYROIDISM: ICD-10-CM

## 2025-05-12 DIAGNOSIS — I48.0 PAROXYSMAL ATRIAL FIBRILLATION (MULTI): ICD-10-CM

## 2025-05-12 PROBLEM — I11.0 BENIGN HYPERTENSIVE HEART DISEASE WITH HEART FAILURE: Status: RESOLVED | Noted: 2024-12-17 | Resolved: 2025-05-12

## 2025-05-12 PROBLEM — I50.32 CHRONIC HEART FAILURE WITH PRESERVED EJECTION FRACTION: Status: RESOLVED | Noted: 2024-10-28 | Resolved: 2025-05-12

## 2025-05-12 PROBLEM — I42.9 CARDIOMYOPATHY: Status: RESOLVED | Noted: 2023-08-24 | Resolved: 2025-05-12

## 2025-05-12 PROBLEM — I48.19 PERSISTENT ATRIAL FIBRILLATION (MULTI): Status: RESOLVED | Noted: 2023-08-24 | Resolved: 2025-05-12

## 2025-05-12 PROBLEM — F03.90 MAJOR NEUROCOGNITIVE DISORDER (MULTI): Status: RESOLVED | Noted: 2023-08-24 | Resolved: 2025-05-12

## 2025-05-12 PROCEDURE — G2211 COMPLEX E/M VISIT ADD ON: HCPCS | Performed by: FAMILY MEDICINE

## 2025-05-12 PROCEDURE — 1036F TOBACCO NON-USER: CPT | Performed by: FAMILY MEDICINE

## 2025-05-12 PROCEDURE — 1160F RVW MEDS BY RX/DR IN RCRD: CPT | Performed by: FAMILY MEDICINE

## 2025-05-12 PROCEDURE — 1123F ACP DISCUSS/DSCN MKR DOCD: CPT | Performed by: FAMILY MEDICINE

## 2025-05-12 PROCEDURE — 1159F MED LIST DOCD IN RCRD: CPT | Performed by: FAMILY MEDICINE

## 2025-05-12 PROCEDURE — 99214 OFFICE O/P EST MOD 30 MIN: CPT | Performed by: FAMILY MEDICINE

## 2025-05-12 NOTE — PROGRESS NOTES
"Subjective   Patient ID: Poppy Pop is a 74 y.o. female who presents for Bloated and Constipation (6 Days).    HPI     The patient is taking levothyroxine for hypothyroidism and metoprolol for hypertension, Eliquis, risperidone. She is taking these medications as prescribed and denies having side effects from them. Blood pressure is well controlled at this time.     Blood pressure is well controlled at this time.  Today she presents with concerns over bloating and constipation. Prunes, figs, akira seeds, drinking water or walking around were not beneficial. She has constipation alternating with diarrhea.  She mentions her bowl movements are \"mushy\"and urine is \"hot\" for which she started drinking cold water.  She is avoiding flour or meat now and following a simple diet. Also feels less energetic and back pain.    Review of Systems  Constitutional: No fever or chills  Cardiovascular: no chest pain, no palpitations and no syncope.   Respiratory: no cough, no shortness of breath during exertion and no shortness of breath at rest.   Gastrointestinal: no abdominal pain, no nausea and no vomiting. +bloating and constipation.   Neuro: No Headache, no dizziness  MSK:  +back pain    Objective   /86   Pulse 99   Ht 1.6 m (5' 3\")   Wt 73 kg (161 lb)   SpO2 94%   BMI 28.52 kg/m²     Physical Exam  Constitutional: Alert and in no acute distress. Well developed, well nourished  Head and Face: Head and face: Normal.    Cardiovascular: Heart rate and rhythm were normal, normal S1 and S2. No peripheral edema.   Pulmonary: No respiratory distress. Clear bilateral breath sounds.  Musculoskeletal: Gait and station: Normal. Muscle strength/tone: Normal.   Skin: Normal skin color and pigmentation, normal skin turgor, and no rash.    Psychiatric: Judgment and insight: Intact. Mood and affect: Normal.      Lab Results   Component Value Date    WBC 5.2 10/17/2024    HGB 17.1 (H) 10/17/2024    HCT 54.0 (H) 10/17/2024     " 10/17/2024    CHOL 195 10/17/2024    TRIG 125 10/17/2024    HDL 76.2 10/17/2024    ALT 25 10/17/2024    AST 24 10/17/2024     12/13/2024    K 4.7 12/13/2024     12/13/2024    CREATININE 0.90 12/13/2024    BUN 17 12/13/2024    CO2 33 (H) 12/13/2024    TSH 3.55 10/17/2024    HGBA1C 5.4 10/17/2024       BI mammo bilateral screening tomosynthesis  Narrative: Interpreted By:  Eloisa Justice,   STUDY:  BI MAMMO BILATERAL SCREENING TOMOSYNTHESIS;  2/11/2025 1:39 pm      ACCESSION NUMBER(S):  [id number]      ORDERING CLINICIAN:  ROSAURA CHICAS      INDICATION:  Screening.      ,Z12.31 Encounter for screening mammogram for malignant neoplasm of  breast      COMPARISON:  06/08/2022 12/09/2019      FINDINGS:  2D and tomosynthesis images were reviewed at 1 mm slice thickness.      Density:  There are scattered areas of fibroglandular density.      No suspicious masses or calcifications are identified.      Impression: No mammographic evidence of malignancy.      BI-RADS CATEGORY:  BI-RADS Category:  1 Negative.  Recommendation:  Annual Screening.  Recommended Date:  1 Year.  Laterality:  Bilateral.              For any future breast imaging appointments, please call [phone] ([phone]).          MACRO:  None      Signed by: Eloisa Justice 2/12/2025 3:20 PM  Dictation workstation:   [id number]      Assessment/Plan   Assessment & Plan  Chronic constipation  Encouraged to eat figs regularly.  Try MiraLax or Benefiber.        Bipolar 1 disorder (Multi)  Patient is on risperidone and follows with Psych.   Orders:    Comprehensive Metabolic Panel; Future    CBC; Future    Paroxysmal atrial fibrillation (Multi)  Continue Eliquis and Metoprolol and follow up with Cardiology        Dysuria  UA ordered  Orders:    Urinalysis with Reflex Culture and Microscopic; Future    Subclinical hypothyroidism  Ordered blood work.  Orders:    TSH with reflex to Free T4 if abnormal; Future        Your yearly Physical is due in: oct  2025  When you call the office for your yearly Physical, please ask them to inform me to order your blood work, so that you can get the fasting blood work before your appointment and we can discuss the results at your physical.      Please call me if any questions arise from now until your next visit. I will call you after I am done seeing patients. A Doctor is always available by phone when the office is closed. Please feel free to call for help with any problem that you feel shouldn't wait until the office re-opens.     I, Anna Finney MD, attest that this note for 5/12/2025 accurately reflects documentation that my scribe Carlos Ta, made at my direction in my capacity as Anna Finney MD when I treated Poppy Pop.    Scribe Attestation  By signing my name below, Carlos ROSS Scribe   attest that this documentation has been prepared under the direction and in the presence of Anna Finney MD.

## 2025-05-12 NOTE — ASSESSMENT & PLAN NOTE
Patient is on risperidone and follows with Psych.   Orders:    Comprehensive Metabolic Panel; Future    CBC; Future

## 2025-05-13 LAB
ALBUMIN SERPL-MCNC: 4.6 G/DL (ref 3.6–5.1)
ALP SERPL-CCNC: 86 U/L (ref 37–153)
ALT SERPL-CCNC: 25 U/L (ref 6–29)
ANION GAP SERPL CALCULATED.4IONS-SCNC: 13 MMOL/L (CALC) (ref 7–17)
AST SERPL-CCNC: 27 U/L (ref 10–35)
BILIRUB SERPL-MCNC: 0.8 MG/DL (ref 0.2–1.2)
BUN SERPL-MCNC: 24 MG/DL (ref 7–25)
CALCIUM SERPL-MCNC: 10.1 MG/DL (ref 8.6–10.4)
CHLORIDE SERPL-SCNC: 99 MMOL/L (ref 98–110)
CO2 SERPL-SCNC: 27 MMOL/L (ref 20–32)
CREAT SERPL-MCNC: 0.84 MG/DL (ref 0.6–1)
EGFRCR SERPLBLD CKD-EPI 2021: 72 ML/MIN/1.73M2
ERYTHROCYTE [DISTWIDTH] IN BLOOD BY AUTOMATED COUNT: 14.6 % (ref 11–15)
GLUCOSE SERPL-MCNC: 82 MG/DL (ref 65–139)
HCT VFR BLD AUTO: 56.7 % (ref 35–45)
HGB BLD-MCNC: 17.8 G/DL (ref 11.7–15.5)
MCH RBC QN AUTO: 31.2 PG (ref 27–33)
MCHC RBC AUTO-ENTMCNC: 31.4 G/DL (ref 32–36)
MCV RBC AUTO: 99.3 FL (ref 80–100)
PLATELET # BLD AUTO: 224 THOUSAND/UL (ref 140–400)
PMV BLD REES-ECKER: 9.8 FL (ref 7.5–12.5)
POTASSIUM SERPL-SCNC: 4.3 MMOL/L (ref 3.5–5.3)
PROT SERPL-MCNC: 8.1 G/DL (ref 6.1–8.1)
RBC # BLD AUTO: 5.71 MILLION/UL (ref 3.8–5.1)
SODIUM SERPL-SCNC: 139 MMOL/L (ref 135–146)
TSH SERPL-ACNC: 2.62 MIU/L (ref 0.4–4.5)
WBC # BLD AUTO: 5.1 THOUSAND/UL (ref 3.8–10.8)

## 2025-05-15 ENCOUNTER — TELEPHONE (OUTPATIENT)
Dept: PRIMARY CARE | Facility: CLINIC | Age: 75
End: 2025-05-15
Payer: MEDICARE

## 2025-05-15 LAB
APPEARANCE UR: CLEAR
BACTERIA #/AREA URNS HPF: ABNORMAL /HPF
BACTERIA UR CULT: ABNORMAL
BACTERIA UR CULT: ABNORMAL
BILIRUB UR QL STRIP: NEGATIVE
COLOR UR: YELLOW
GLUCOSE UR QL STRIP: NEGATIVE
HGB UR QL STRIP: NEGATIVE
HYALINE CASTS #/AREA URNS LPF: ABNORMAL /LPF
KETONES UR QL STRIP: NEGATIVE
LEUKOCYTE ESTERASE UR QL STRIP: ABNORMAL
NITRITE UR QL STRIP: NEGATIVE
PH UR STRIP: 5.5 [PH] (ref 5–8)
PROT UR QL STRIP: NEGATIVE
RBC #/AREA URNS HPF: ABNORMAL /HPF
SERVICE CMNT-IMP: ABNORMAL
SP GR UR STRIP: 1.01 (ref 1–1.03)
SQUAMOUS #/AREA URNS HPF: ABNORMAL /HPF
WBC #/AREA URNS HPF: ABNORMAL /HPF

## 2025-05-22 ENCOUNTER — APPOINTMENT (OUTPATIENT)
Dept: BEHAVIORAL HEALTH | Facility: CLINIC | Age: 75
End: 2025-05-22
Payer: MEDICARE

## 2025-06-11 ENCOUNTER — APPOINTMENT (OUTPATIENT)
Dept: GASTROENTEROLOGY | Facility: CLINIC | Age: 75
End: 2025-06-11
Payer: MEDICARE

## 2025-06-11 VITALS — HEIGHT: 63 IN | OXYGEN SATURATION: 94 % | BODY MASS INDEX: 29.23 KG/M2 | HEART RATE: 94 BPM | WEIGHT: 165 LBS

## 2025-06-11 DIAGNOSIS — K59.00 CONSTIPATION, UNSPECIFIED CONSTIPATION TYPE: ICD-10-CM

## 2025-06-11 DIAGNOSIS — R10.9 ABDOMINAL PAIN, UNSPECIFIED ABDOMINAL LOCATION: Primary | ICD-10-CM

## 2025-06-11 DIAGNOSIS — K59.00 OBSTIPATION: ICD-10-CM

## 2025-06-11 PROCEDURE — 1159F MED LIST DOCD IN RCRD: CPT | Performed by: INTERNAL MEDICINE

## 2025-06-11 PROCEDURE — 99204 OFFICE O/P NEW MOD 45 MIN: CPT | Performed by: INTERNAL MEDICINE

## 2025-06-11 NOTE — PATIENT INSTRUCTIONS
Take one bottle of magnesium citrate in the morning on a day you are free.  If you do not start to have bowel movements after 2 hours, start drinking a second bottle.  Expect to have a lot of diarrhea and possibly cramping.    The next day start bisacodyl 5 mg one tablet daily.  If you do not have a good movement with one pill, increase to two pills daily.
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
## 2025-06-12 DIAGNOSIS — E03.8 SUBCLINICAL HYPOTHYROIDISM: ICD-10-CM

## 2025-06-12 LAB
CREAT SERPL-MCNC: 0.88 MG/DL (ref 0.6–1)
EGFRCR SERPLBLD CKD-EPI 2021: 68 ML/MIN/1.73M2

## 2025-06-13 RX ORDER — LEVOTHYROXINE SODIUM 25 UG/1
37.5 TABLET ORAL DAILY
Qty: 135 TABLET | Refills: 0 | Status: SHIPPED | OUTPATIENT
Start: 2025-06-13

## 2025-07-24 DIAGNOSIS — I48.91 UNSPECIFIED ATRIAL FIBRILLATION (MULTI): ICD-10-CM

## 2025-07-28 RX ORDER — APIXABAN 5 MG/1
5 TABLET, FILM COATED ORAL 2 TIMES DAILY
Qty: 180 TABLET | Refills: 2 | Status: SHIPPED | OUTPATIENT
Start: 2025-07-28

## 2025-07-31 ENCOUNTER — APPOINTMENT (OUTPATIENT)
Dept: BEHAVIORAL HEALTH | Facility: CLINIC | Age: 75
End: 2025-07-31
Payer: MEDICARE

## 2025-09-04 ENCOUNTER — APPOINTMENT (OUTPATIENT)
Dept: BEHAVIORAL HEALTH | Facility: CLINIC | Age: 75
End: 2025-09-04
Payer: MEDICARE

## 2025-09-04 DIAGNOSIS — I48.91 UNSPECIFIED ATRIAL FIBRILLATION (MULTI): ICD-10-CM

## 2025-09-04 DIAGNOSIS — I50.32 CHRONIC HEART FAILURE WITH PRESERVED EJECTION FRACTION: ICD-10-CM

## 2025-09-04 DIAGNOSIS — E03.8 SUBCLINICAL HYPOTHYROIDISM: ICD-10-CM

## 2025-09-04 DIAGNOSIS — I48.19 PERSISTENT ATRIAL FIBRILLATION (MULTI): ICD-10-CM

## 2025-09-04 DIAGNOSIS — I77.810 AORTIC ECTASIA, THORACIC: ICD-10-CM

## 2025-09-04 RX ORDER — METOPROLOL SUCCINATE 100 MG/1
100 TABLET, EXTENDED RELEASE ORAL DAILY
Qty: 90 TABLET | Refills: 3 | Status: SHIPPED | OUTPATIENT
Start: 2025-09-04 | End: 2026-09-04

## 2025-09-04 RX ORDER — LEVOTHYROXINE SODIUM 25 UG/1
37.5 TABLET ORAL DAILY
Qty: 135 TABLET | Refills: 3 | Status: SHIPPED | OUTPATIENT
Start: 2025-09-04 | End: 2026-09-04

## 2025-09-04 RX ORDER — SPIRONOLACTONE 25 MG/1
25 TABLET ORAL DAILY
Qty: 90 TABLET | Refills: 3 | Status: SHIPPED | OUTPATIENT
Start: 2025-09-04 | End: 2026-09-04

## 2025-10-09 ENCOUNTER — APPOINTMENT (OUTPATIENT)
Dept: BEHAVIORAL HEALTH | Facility: CLINIC | Age: 75
End: 2025-10-09
Payer: MEDICARE